# Patient Record
Sex: FEMALE | Race: BLACK OR AFRICAN AMERICAN | NOT HISPANIC OR LATINO | URBAN - METROPOLITAN AREA
[De-identification: names, ages, dates, MRNs, and addresses within clinical notes are randomized per-mention and may not be internally consistent; named-entity substitution may affect disease eponyms.]

---

## 2017-12-10 ENCOUNTER — APPOINTMENT (EMERGENCY)
Dept: RADIOLOGY | Facility: HOSPITAL | Age: 57
DRG: 064 | End: 2017-12-10
Payer: MEDICARE

## 2017-12-10 ENCOUNTER — HOSPITAL ENCOUNTER (INPATIENT)
Facility: HOSPITAL | Age: 57
LOS: 5 days | DRG: 064 | End: 2017-12-15
Attending: EMERGENCY MEDICINE | Admitting: HOSPITALIST
Payer: MEDICARE

## 2017-12-10 DIAGNOSIS — I16.1 HYPERTENSIVE EMERGENCY: ICD-10-CM

## 2017-12-10 DIAGNOSIS — I51.9 CARDIAC DISEASE: Chronic | ICD-10-CM

## 2017-12-10 DIAGNOSIS — E11.9 DIABETES MELLITUS (HCC): Chronic | ICD-10-CM

## 2017-12-10 DIAGNOSIS — N17.9 AKI (ACUTE KIDNEY INJURY) (HCC): ICD-10-CM

## 2017-12-10 DIAGNOSIS — I16.0 HYPERTENSIVE URGENCY: ICD-10-CM

## 2017-12-10 DIAGNOSIS — R56.9 SEIZURES (HCC): Chronic | ICD-10-CM

## 2017-12-10 DIAGNOSIS — R41.82 ALTERED MENTAL STATUS: Primary | ICD-10-CM

## 2017-12-10 DIAGNOSIS — I50.9 CHF (CONGESTIVE HEART FAILURE) (HCC): Chronic | ICD-10-CM

## 2017-12-10 DIAGNOSIS — R73.9 HYPERGLYCEMIA: ICD-10-CM

## 2017-12-10 PROBLEM — I10 HYPERTENSION: Chronic | Status: ACTIVE | Noted: 2017-12-10

## 2017-12-10 PROBLEM — J44.9 COPD (CHRONIC OBSTRUCTIVE PULMONARY DISEASE) (HCC): Chronic | Status: ACTIVE | Noted: 2017-12-10

## 2017-12-10 LAB
ACETONE SERPL-MCNC: NEGATIVE MG/DL
ALBUMIN SERPL BCP-MCNC: 3.6 G/DL (ref 3.5–5)
ALP SERPL-CCNC: 143 U/L (ref 46–116)
ALT SERPL W P-5'-P-CCNC: 25 U/L (ref 12–78)
ANION GAP BLD CALC-SCNC: 18 MMOL/L (ref 4–13)
ANION GAP SERPL CALCULATED.3IONS-SCNC: 6 MMOL/L (ref 4–13)
AST SERPL W P-5'-P-CCNC: 10 U/L (ref 5–45)
BACTERIA UR QL AUTO: NORMAL /HPF
BASOPHILS # BLD AUTO: 0.01 THOUSANDS/ΜL (ref 0–0.1)
BASOPHILS NFR BLD AUTO: 0 % (ref 0–1)
BILIRUB SERPL-MCNC: 0.27 MG/DL (ref 0.2–1)
BILIRUB UR QL STRIP: NEGATIVE
BUN BLD-MCNC: 23 MG/DL (ref 5–25)
BUN SERPL-MCNC: 23 MG/DL (ref 5–25)
CA-I BLD-SCNC: 1.17 MMOL/L (ref 1.12–1.32)
CALCIUM SERPL-MCNC: 9.5 MG/DL (ref 8.3–10.1)
CHLORIDE BLD-SCNC: 96 MMOL/L (ref 100–108)
CHLORIDE SERPL-SCNC: 98 MMOL/L (ref 100–108)
CLARITY UR: CLEAR
CLARITY, POC: CLEAR
CO2 SERPL-SCNC: 32 MMOL/L (ref 21–32)
COLOR UR: YELLOW
COLOR, POC: YELLOW
CREAT BLD-MCNC: 1.3 MG/DL (ref 0.6–1.3)
CREAT SERPL-MCNC: 1.44 MG/DL (ref 0.6–1.3)
EOSINOPHIL # BLD AUTO: 0.15 THOUSAND/ΜL (ref 0–0.61)
EOSINOPHIL NFR BLD AUTO: 2 % (ref 0–6)
ERYTHROCYTE [DISTWIDTH] IN BLOOD BY AUTOMATED COUNT: 13.9 % (ref 11.6–15.1)
EST. AVERAGE GLUCOSE BLD GHB EST-MCNC: 275 MG/DL
EXT BILIRUBIN, UA: ABNORMAL
EXT BLOOD URINE: ABNORMAL
EXT GLUCOSE, UA: >500
EXT KETONES: ABNORMAL
EXT NITRITE, UA: ABNORMAL
EXT PH, UA: 5.5
EXT PROTEIN, UA: ABNORMAL
EXT SPECIFIC GRAVITY, UA: 1.01
EXT UROBILINOGEN: 0.2
GFR SERPL CREATININE-BSD FRML MDRD: 46 ML/MIN/1.73SQ M
GFR SERPL CREATININE-BSD FRML MDRD: 52 ML/MIN/1.73SQ M
GLUCOSE SERPL-MCNC: 368 MG/DL (ref 65–140)
GLUCOSE SERPL-MCNC: 380 MG/DL (ref 65–140)
GLUCOSE SERPL-MCNC: 552 MG/DL (ref 65–140)
GLUCOSE SERPL-MCNC: 553 MG/DL (ref 65–140)
GLUCOSE UR STRIP-MCNC: ABNORMAL MG/DL
HBA1C MFR BLD: 11.2 % (ref 4.2–6.3)
HCT VFR BLD AUTO: 39.1 % (ref 34.8–46.1)
HCT VFR BLD CALC: 39 % (ref 34.8–46.1)
HGB BLD-MCNC: 12.2 G/DL (ref 11.5–15.4)
HGB BLDA-MCNC: 13.3 G/DL (ref 11.5–15.4)
HGB UR QL STRIP.AUTO: ABNORMAL
HYALINE CASTS #/AREA URNS LPF: NORMAL /LPF
KETONES UR STRIP-MCNC: NEGATIVE MG/DL
LEUKOCYTE ESTERASE UR QL STRIP: NEGATIVE
LYMPHOCYTES # BLD AUTO: 2.8 THOUSANDS/ΜL (ref 0.6–4.47)
LYMPHOCYTES NFR BLD AUTO: 33 % (ref 14–44)
MAGNESIUM SERPL-MCNC: 2.1 MG/DL (ref 1.6–2.6)
MCH RBC QN AUTO: 27.2 PG (ref 26.8–34.3)
MCHC RBC AUTO-ENTMCNC: 31.2 G/DL (ref 31.4–37.4)
MCV RBC AUTO: 87 FL (ref 82–98)
MONOCYTES # BLD AUTO: 0.4 THOUSAND/ΜL (ref 0.17–1.22)
MONOCYTES NFR BLD AUTO: 5 % (ref 4–12)
NEUTROPHILS # BLD AUTO: 5.2 THOUSANDS/ΜL (ref 1.85–7.62)
NEUTS SEG NFR BLD AUTO: 60 % (ref 43–75)
NITRITE UR QL STRIP: NEGATIVE
NON-SQ EPI CELLS URNS QL MICRO: NORMAL /HPF
NRBC BLD AUTO-RTO: 0 /100 WBCS
NT-PROBNP SERPL-MCNC: 787 PG/ML
PCO2 BLD: 29 MMOL/L (ref 21–32)
PH UR STRIP.AUTO: 5.5 [PH] (ref 4.5–8)
PHOSPHATE SERPL-MCNC: 3.9 MG/DL (ref 2.7–4.5)
PLATELET # BLD AUTO: 251 THOUSANDS/UL (ref 149–390)
PLATELET # BLD AUTO: 253 THOUSANDS/UL (ref 149–390)
PMV BLD AUTO: 11.6 FL (ref 8.9–12.7)
PMV BLD AUTO: 11.9 FL (ref 8.9–12.7)
POTASSIUM BLD-SCNC: 4.5 MMOL/L (ref 3.5–5.3)
POTASSIUM SERPL-SCNC: 4.4 MMOL/L (ref 3.5–5.3)
PROT SERPL-MCNC: 8.1 G/DL (ref 6.4–8.2)
PROT UR STRIP-MCNC: ABNORMAL MG/DL
RBC # BLD AUTO: 4.48 MILLION/UL (ref 3.81–5.12)
RBC #/AREA URNS AUTO: NORMAL /HPF
SODIUM BLD-SCNC: 137 MMOL/L (ref 136–145)
SODIUM SERPL-SCNC: 136 MMOL/L (ref 136–145)
SP GR UR STRIP.AUTO: 1.01 (ref 1–1.03)
SPECIMEN SOURCE: ABNORMAL
SPECIMEN SOURCE: NORMAL
TROPONIN I BLD-MCNC: 0.05 NG/ML (ref 0–0.08)
TROPONIN I SERPL-MCNC: 0.49 NG/ML
TSH SERPL DL<=0.05 MIU/L-ACNC: 0.53 UIU/ML (ref 0.36–3.74)
UROBILINOGEN UR QL STRIP.AUTO: 0.2 E.U./DL
WBC # BLD AUTO: 8.59 THOUSAND/UL (ref 4.31–10.16)
WBC # BLD EST: ABNORMAL 10*3/UL
WBC #/AREA URNS AUTO: NORMAL /HPF

## 2017-12-10 PROCEDURE — 80047 BASIC METABLC PNL IONIZED CA: CPT

## 2017-12-10 PROCEDURE — 96361 HYDRATE IV INFUSION ADD-ON: CPT

## 2017-12-10 PROCEDURE — 80053 COMPREHEN METABOLIC PANEL: CPT | Performed by: EMERGENCY MEDICINE

## 2017-12-10 PROCEDURE — 81002 URINALYSIS NONAUTO W/O SCOPE: CPT | Performed by: EMERGENCY MEDICINE

## 2017-12-10 PROCEDURE — 83880 ASSAY OF NATRIURETIC PEPTIDE: CPT | Performed by: EMERGENCY MEDICINE

## 2017-12-10 PROCEDURE — 96376 TX/PRO/DX INJ SAME DRUG ADON: CPT

## 2017-12-10 PROCEDURE — 70450 CT HEAD/BRAIN W/O DYE: CPT

## 2017-12-10 PROCEDURE — 84443 ASSAY THYROID STIM HORMONE: CPT | Performed by: INTERNAL MEDICINE

## 2017-12-10 PROCEDURE — 80186 ASSAY OF PHENYTOIN FREE: CPT | Performed by: INTERNAL MEDICINE

## 2017-12-10 PROCEDURE — 71020 HB CHEST X-RAY 2VW FRONTAL&LATL: CPT

## 2017-12-10 PROCEDURE — 96374 THER/PROPH/DIAG INJ IV PUSH: CPT

## 2017-12-10 PROCEDURE — 93005 ELECTROCARDIOGRAM TRACING: CPT | Performed by: EMERGENCY MEDICINE

## 2017-12-10 PROCEDURE — 96375 TX/PRO/DX INJ NEW DRUG ADDON: CPT

## 2017-12-10 PROCEDURE — 81001 URINALYSIS AUTO W/SCOPE: CPT

## 2017-12-10 PROCEDURE — 85049 AUTOMATED PLATELET COUNT: CPT | Performed by: INTERNAL MEDICINE

## 2017-12-10 PROCEDURE — 85014 HEMATOCRIT: CPT

## 2017-12-10 PROCEDURE — 83735 ASSAY OF MAGNESIUM: CPT | Performed by: EMERGENCY MEDICINE

## 2017-12-10 PROCEDURE — 84100 ASSAY OF PHOSPHORUS: CPT | Performed by: EMERGENCY MEDICINE

## 2017-12-10 PROCEDURE — 84484 ASSAY OF TROPONIN QUANT: CPT

## 2017-12-10 PROCEDURE — 83036 HEMOGLOBIN GLYCOSYLATED A1C: CPT | Performed by: INTERNAL MEDICINE

## 2017-12-10 PROCEDURE — 85025 COMPLETE CBC W/AUTO DIFF WBC: CPT | Performed by: EMERGENCY MEDICINE

## 2017-12-10 PROCEDURE — 82009 KETONE BODYS QUAL: CPT | Performed by: EMERGENCY MEDICINE

## 2017-12-10 PROCEDURE — 82948 REAGENT STRIP/BLOOD GLUCOSE: CPT

## 2017-12-10 PROCEDURE — 99285 EMERGENCY DEPT VISIT HI MDM: CPT

## 2017-12-10 PROCEDURE — 36415 COLL VENOUS BLD VENIPUNCTURE: CPT | Performed by: EMERGENCY MEDICINE

## 2017-12-10 PROCEDURE — 84484 ASSAY OF TROPONIN QUANT: CPT | Performed by: INTERNAL MEDICINE

## 2017-12-10 RX ORDER — ACETAMINOPHEN 325 MG/1
650 TABLET ORAL EVERY 6 HOURS PRN
Status: DISCONTINUED | OUTPATIENT
Start: 2017-12-10 | End: 2017-12-15 | Stop reason: HOSPADM

## 2017-12-10 RX ORDER — LEVETIRACETAM 500 MG/1
1000 TABLET ORAL EVERY 12 HOURS SCHEDULED
Status: DISCONTINUED | OUTPATIENT
Start: 2017-12-10 | End: 2017-12-15 | Stop reason: HOSPADM

## 2017-12-10 RX ORDER — SENNOSIDES 8.6 MG
1 TABLET ORAL DAILY
Status: DISCONTINUED | OUTPATIENT
Start: 2017-12-11 | End: 2017-12-15 | Stop reason: HOSPADM

## 2017-12-10 RX ORDER — CLONIDINE HYDROCHLORIDE 0.2 MG/1
0.2 TABLET ORAL 2 TIMES DAILY
Status: DISCONTINUED | OUTPATIENT
Start: 2017-12-10 | End: 2017-12-15 | Stop reason: HOSPADM

## 2017-12-10 RX ORDER — SODIUM CHLORIDE 9 MG/ML
125 INJECTION, SOLUTION INTRAVENOUS CONTINUOUS
Status: DISCONTINUED | OUTPATIENT
Start: 2017-12-10 | End: 2017-12-10

## 2017-12-10 RX ORDER — METOPROLOL SUCCINATE 50 MG/1
50 TABLET, EXTENDED RELEASE ORAL DAILY
Status: DISCONTINUED | OUTPATIENT
Start: 2017-12-11 | End: 2017-12-13

## 2017-12-10 RX ORDER — ONDANSETRON 2 MG/ML
4 INJECTION INTRAMUSCULAR; INTRAVENOUS EVERY 6 HOURS PRN
Status: DISCONTINUED | OUTPATIENT
Start: 2017-12-10 | End: 2017-12-15 | Stop reason: HOSPADM

## 2017-12-10 RX ORDER — MAGNESIUM HYDROXIDE/ALUMINUM HYDROXICE/SIMETHICONE 120; 1200; 1200 MG/30ML; MG/30ML; MG/30ML
30 SUSPENSION ORAL EVERY 6 HOURS PRN
Status: DISCONTINUED | OUTPATIENT
Start: 2017-12-10 | End: 2017-12-15 | Stop reason: HOSPADM

## 2017-12-10 RX ORDER — CLONIDINE HYDROCHLORIDE 0.2 MG/1
0.2 TABLET ORAL 2 TIMES DAILY
COMMUNITY

## 2017-12-10 RX ORDER — ONDANSETRON 2 MG/ML
INJECTION INTRAMUSCULAR; INTRAVENOUS
Status: COMPLETED
Start: 2017-12-10 | End: 2017-12-10

## 2017-12-10 RX ORDER — INSULIN GLARGINE 100 [IU]/ML
50 INJECTION, SOLUTION SUBCUTANEOUS 2 TIMES DAILY
COMMUNITY

## 2017-12-10 RX ORDER — PHENYTOIN SODIUM 100 MG/1
100 CAPSULE, EXTENDED RELEASE ORAL 3 TIMES DAILY
Status: DISCONTINUED | OUTPATIENT
Start: 2017-12-10 | End: 2017-12-15 | Stop reason: HOSPADM

## 2017-12-10 RX ORDER — CALCIUM CARBONATE 200(500)MG
1000 TABLET,CHEWABLE ORAL 3 TIMES DAILY PRN
Status: DISCONTINUED | OUTPATIENT
Start: 2017-12-10 | End: 2017-12-15 | Stop reason: HOSPADM

## 2017-12-10 RX ORDER — LEVOCETIRIZINE DIHYDROCHLORIDE 5 MG/1
5 TABLET, FILM COATED ORAL EVERY EVENING
COMMUNITY

## 2017-12-10 RX ORDER — HYDRALAZINE HYDROCHLORIDE 20 MG/ML
10 INJECTION INTRAMUSCULAR; INTRAVENOUS ONCE
Status: COMPLETED | OUTPATIENT
Start: 2017-12-10 | End: 2017-12-10

## 2017-12-10 RX ORDER — LABETALOL HYDROCHLORIDE 5 MG/ML
20 INJECTION, SOLUTION INTRAVENOUS ONCE
Status: COMPLETED | OUTPATIENT
Start: 2017-12-10 | End: 2017-12-10

## 2017-12-10 RX ORDER — CLOPIDOGREL BISULFATE 75 MG/1
75 TABLET ORAL DAILY
COMMUNITY

## 2017-12-10 RX ORDER — ASPIRIN 81 MG/1
81 TABLET ORAL 2 TIMES DAILY
COMMUNITY

## 2017-12-10 RX ORDER — DOCUSATE SODIUM 100 MG/1
100 CAPSULE, LIQUID FILLED ORAL 2 TIMES DAILY
Status: DISCONTINUED | OUTPATIENT
Start: 2017-12-10 | End: 2017-12-15 | Stop reason: HOSPADM

## 2017-12-10 RX ORDER — ACETAMINOPHEN 325 MG/1
975 TABLET ORAL EVERY 6 HOURS PRN
Status: DISCONTINUED | OUTPATIENT
Start: 2017-12-10 | End: 2017-12-15 | Stop reason: HOSPADM

## 2017-12-10 RX ORDER — INSULIN GLARGINE 100 [IU]/ML
10 INJECTION, SOLUTION SUBCUTANEOUS ONCE
Status: DISCONTINUED | OUTPATIENT
Start: 2017-12-10 | End: 2017-12-10

## 2017-12-10 RX ORDER — PANTOPRAZOLE SODIUM 40 MG/1
40 INJECTION, POWDER, FOR SOLUTION INTRAVENOUS
Status: DISCONTINUED | OUTPATIENT
Start: 2017-12-11 | End: 2017-12-14

## 2017-12-10 RX ORDER — FUROSEMIDE 10 MG/ML
40 INJECTION INTRAMUSCULAR; INTRAVENOUS ONCE
Status: COMPLETED | OUTPATIENT
Start: 2017-12-10 | End: 2017-12-10

## 2017-12-10 RX ORDER — LISINOPRIL 2.5 MG/1
2.5 TABLET ORAL DAILY
COMMUNITY
End: 2017-12-15 | Stop reason: HOSPADM

## 2017-12-10 RX ORDER — CLOPIDOGREL BISULFATE 75 MG/1
75 TABLET ORAL DAILY
Status: DISCONTINUED | OUTPATIENT
Start: 2017-12-11 | End: 2017-12-15 | Stop reason: HOSPADM

## 2017-12-10 RX ORDER — PHENYTOIN SODIUM 100 MG/1
100 CAPSULE, EXTENDED RELEASE ORAL
Status: DISCONTINUED | OUTPATIENT
Start: 2017-12-10 | End: 2017-12-15 | Stop reason: HOSPADM

## 2017-12-10 RX ORDER — METOPROLOL SUCCINATE 50 MG/1
50 TABLET, EXTENDED RELEASE ORAL DAILY
COMMUNITY
End: 2017-12-15 | Stop reason: HOSPADM

## 2017-12-10 RX ORDER — ONDANSETRON 2 MG/ML
4 INJECTION INTRAMUSCULAR; INTRAVENOUS ONCE
Status: COMPLETED | OUTPATIENT
Start: 2017-12-10 | End: 2017-12-10

## 2017-12-10 RX ORDER — HYDRALAZINE HYDROCHLORIDE 20 MG/ML
10 INJECTION INTRAMUSCULAR; INTRAVENOUS EVERY 6 HOURS SCHEDULED
Status: DISCONTINUED | OUTPATIENT
Start: 2017-12-11 | End: 2017-12-11

## 2017-12-10 RX ORDER — ATORVASTATIN CALCIUM 40 MG/1
40 TABLET, FILM COATED ORAL DAILY
COMMUNITY

## 2017-12-10 RX ORDER — PHENYTOIN SODIUM 100 MG/1
100 CAPSULE, EXTENDED RELEASE ORAL 3 TIMES DAILY
COMMUNITY

## 2017-12-10 RX ADMIN — ONDANSETRON 4 MG: 2 INJECTION INTRAMUSCULAR; INTRAVENOUS at 18:05

## 2017-12-10 RX ADMIN — Medication 1000 MG: at 22:02

## 2017-12-10 RX ADMIN — SODIUM CHLORIDE 1000 ML: 0.9 INJECTION, SOLUTION INTRAVENOUS at 17:00

## 2017-12-10 RX ADMIN — LABETALOL 20 MG/4 ML (5 MG/ML) INTRAVENOUS SYRINGE 20 MG: at 19:32

## 2017-12-10 RX ADMIN — PHENYTOIN SODIUM 100 MG: 100 CAPSULE ORAL at 22:28

## 2017-12-10 RX ADMIN — Medication 8 UNITS/HR: at 22:24

## 2017-12-10 RX ADMIN — LEVETIRACETAM 1000 MG: 500 TABLET ORAL at 21:47

## 2017-12-10 RX ADMIN — CLONIDINE HYDROCHLORIDE 0.2 MG: 0.2 TABLET ORAL at 22:02

## 2017-12-10 RX ADMIN — FUROSEMIDE 40 MG: 10 INJECTION, SOLUTION INTRAMUSCULAR; INTRAVENOUS at 23:13

## 2017-12-10 RX ADMIN — DOCUSATE SODIUM 100 MG: 100 CAPSULE, LIQUID FILLED ORAL at 22:27

## 2017-12-10 RX ADMIN — SODIUM CHLORIDE 125 ML/HR: 0.9 INJECTION, SOLUTION INTRAVENOUS at 21:21

## 2017-12-10 RX ADMIN — HYDRALAZINE HYDROCHLORIDE 10 MG: 20 INJECTION INTRAMUSCULAR; INTRAVENOUS at 23:13

## 2017-12-10 RX ADMIN — SODIUM CHLORIDE 1000 ML: 0.9 INJECTION, SOLUTION INTRAVENOUS at 19:25

## 2017-12-10 RX ADMIN — NITROGLYCERIN 1 INCH: 20 OINTMENT TOPICAL at 21:47

## 2017-12-10 RX ADMIN — LABETALOL 20 MG/4 ML (5 MG/ML) INTRAVENOUS SYRINGE 20 MG: at 18:07

## 2017-12-10 RX ADMIN — ACETAMINOPHEN 975 MG: 325 TABLET, FILM COATED ORAL at 17:40

## 2017-12-10 RX ADMIN — HYDRALAZINE HYDROCHLORIDE 10 MG: 20 INJECTION INTRAMUSCULAR; INTRAVENOUS at 20:52

## 2017-12-10 NOTE — ED PROVIDER NOTES
History  Chief Complaint   Patient presents with    Altered Mental Status     Patient found staring off at slot machine at casino at 1600  Patient found with BS of over 600 by EMS  Patient here from [place] on a bus trip  Patient also has no had her medications since last week due to "grand-daughter did not pick them up "     Patient is a 61yo F with a pmhx of COPD, CHF, HTN, T2DM, who presents via EMS for AMS  Patient is from [place] and was at the [facility]  when she began to "stare off" at the slot machine  EMS says that patient was minimally responsive when they picked her up  Her blood glucose was > 600  Patient was HTN with BP int he 371'I systolic  Patient has not been taking her insulin or her blood pressure medications for the past week because "her grand daughter did not pick them up "  Patient is complaining of a generalized headache  She denies lightheadedness, dizziness, chest pain, sob, abdominal pain, nausea, vomiting, diarrhea, constipation or leg swelling  On exam, patient is AAO x3  She has no focal neurological deficits  Her lungs are CTA b/l  Abdomen is soft and non-tender  Patient had an episode of vomiting while at X ray  Patient given Zofran with improvement  Assessment and Plan:  Patient is a 61yo F with a pmhx of COPD, HTN, CHF, DM2 who presents for AMS, hyperglycemia and HTN  Will get a CT of head to evaluate for hemorrhage given elevated BP, will get CBC, CMP, Acetone, Mg and Phos to evaluate for possible DKA  Will give patient IV fluids  Cardiac workup  Prior to Admission Medications   Prescriptions Last Dose Informant Patient Reported? Taking?    LEVETIRACETAM PO  Family Member Yes Yes   Sig: Take 100 mg by mouth 2 (two) times a day   aspirin (ECOTRIN LOW STRENGTH) 81 mg EC tablet  Self Yes Yes   Sig: Take 81 mg by mouth 2 (two) times a day     atorvastatin (LIPITOR) 40 mg tablet  Family Member Yes Yes   Sig: Take 40 mg by mouth daily   cloNIDine (CATAPRES) 0 2 mg tablet  Family Member Yes Yes   Sig: Take 0 2 mg by mouth 2 (two) times a day   clopidogrel (PLAVIX) 75 mg tablet  Family Member Yes Yes   Sig: Take 75 mg by mouth daily   insulin aspart (NovoLOG) 100 units/mL injection  Family Member Yes Yes   Sig: Inject 16 Units under the skin 2 (two) times a day before meals   insulin glargine (LANTUS) 100 units/mL subcutaneous injection  Family Member Yes Yes   Sig: Inject 50 Units under the skin 2 (two) times a day   levocetirizine (XYZAL) 5 MG tablet  Family Member Yes Yes   Sig: Take 5 mg by mouth every evening   lisinopril (ZESTRIL) 2 5 mg tablet  Family Member Yes Yes   Sig: Take 2 5 mg by mouth daily   metFORMIN (GLUCOPHAGE) 1000 MG tablet  Family Member Yes Yes   Sig: Take 1,000 mg by mouth 2 (two) times a day with meals   metoprolol succinate (TOPROL-XL) 50 mg 24 hr tablet  Family Member Yes Yes   Sig: Take 50 mg by mouth daily   phenytoin (DILANTIN) 100 mg ER capsule  Family Member Yes Yes   Sig: Take 100 mg by mouth 3 (three) times a day 100 mg at 0900 and 2100    Donan Mort 200 mg at 1500      sitaGLIPtin (JANUVIA) 100 mg tablet  Family Member Yes Yes   Sig: Take 100 mg by mouth daily      Facility-Administered Medications: None       Past Medical History:   Diagnosis Date    Cardiac disease     CHF (congestive heart failure) ([facility] )     COPD (chronic obstructive pulmonary disease) ([facility] )     Diabetes mellitus ([facility] )     Hypertension     Seizures ([place] [address] )        Past Surgical History:   Procedure Laterality Date    CARDIAC CATHETERIZATION  12/2017    R groin access, no intervention    CHOLECYSTECTOMY         History reviewed  No pertinent family history  I have reviewed and agree with the history as documented  Social History   Substance Use Topics    Smoking status: Never Smoker    Smokeless tobacco: Never Used    Alcohol use No        Review of Systems   Constitutional: Negative for chills and fever  HENT: Negative for congestion, sneezing and trouble swallowing  Eyes: Negative for pain, discharge and itching  Respiratory: Negative for cough, chest tightness, shortness of breath and wheezing  Cardiovascular: Negative for chest pain and palpitations  Gastrointestinal: Negative for abdominal pain, diarrhea, nausea and vomiting  Endocrine: Positive for polydipsia and polyuria  Genitourinary: Negative for dysuria, frequency and hematuria  Musculoskeletal: Negative for arthralgias and back pain  Skin: Negative for color change and rash  Neurological: Positive for headaches  Negative for dizziness, weakness, light-headedness and numbness  All other systems reviewed and are negative  Physical Exam  ED Triage Vitals   Temperature Pulse Respirations Blood Pressure SpO2   12/10/17 1726 12/10/17 1726 12/10/17 1726 12/10/17 1726 12/10/17 1726   (!) 97 °F (36 1 °C) 103 21 (!) 225/115 99 %      Temp Source Heart Rate Source Patient Position - Orthostatic VS BP Location FiO2 (%)   12/10/17 1726 12/10/17 1726 12/10/17 1726 12/10/17 1726 --   Tympanic Monitor Sitting Right arm       Pain Score       12/10/17 1831       4           Orthostatic Vital Signs  Vitals:    12/10/17 2127 12/10/17 2313 12/10/17 2321 12/10/17 2341   BP: (!) 205/95 (!) 191/86 (!) 171/77 132/62   Pulse:   100    Patient Position - Orthostatic VS:           Physical Exam   Constitutional: She is oriented to person, place, and time  She appears well-developed and well-nourished  No distress  HENT:   Head: Normocephalic and atraumatic  Eyes: Conjunctivae and EOM are normal  Pupils are equal, round, and reactive to light  Right eye exhibits no discharge  Left eye exhibits no discharge  Neck: Neck supple  Cardiovascular: Normal rate, regular rhythm and normal heart sounds  Exam reveals no friction rub  No murmur heard  Pulmonary/Chest: Effort normal and breath sounds normal  No respiratory distress  She has no wheezes  She has no rales  Abdominal: Soft  She exhibits no distension  There is no tenderness  There is no guarding  Musculoskeletal: She exhibits no edema or deformity  Lymphadenopathy:     She has no cervical adenopathy  Neurological: She is alert and oriented to person, place, and time  No cranial nerve deficit  Skin: Skin is warm and dry         ED Medications  Medications   acetaminophen (TYLENOL) tablet 975 mg (975 mg Oral Given 12/10/17 1740)   hydrALAZINE (APRESOLINE) injection 10 mg (10 mg Intravenous Given 12/10/17 2313)   docusate sodium (COLACE) capsule 100 mg (100 mg Oral Given 12/10/17 2227)   senna (SENOKOT) tablet 8 6 mg (not administered)   ondansetron (ZOFRAN) injection 4 mg (not administered)   aluminum-magnesium hydroxide-simethicone (MYLANTA) 200-200-20 mg/5 mL oral suspension 30 mL (not administered)   enoxaparin (LOVENOX) subcutaneous injection 40 mg (not administered)   acetaminophen (TYLENOL) tablet 650 mg (not administered)   insulin lispro (HumaLOG) 100 units/mL subcutaneous injection 10 Units (not administered)   clopidogrel (PLAVIX) tablet 75 mg (not administered)   metoprolol succinate (TOPROL-XL) 24 hr tablet 50 mg (not administered)   phenytoin (DILANTIN) ER capsule 100 mg (100 mg Oral Not Given 12/10/17 2228)   phenytoin (DILANTIN) ER capsule 100 mg (100 mg Oral Given 12/10/17 2228)   insulin regular (HumuLIN R,NovoLIN R) 1 Units/mL in sodium chloride 0 9 % 100 mL infusion (10 Units/hr Intravenous Rate/Dose Change 12/11/17 0045)   levETIRAcetam (KEPPRA) tablet 1,000 mg (1,000 mg Oral Given 12/10/17 2147)   cloNIDine (CATAPRES) tablet 0 2 mg (0 2 mg Oral Given 12/10/17 2202)   pantoprazole (PROTONIX) injection 40 mg (not administered)   calcium carbonate (TUMS) chewable tablet 1,000 mg (1,000 mg Oral Given 12/10/17 2202)   sodium chloride 0 9 % bolus 1,000 mL (0 mL Intravenous Stopped 12/10/17 1800)   labetalol (NORMODYNE) injection 20 mg (20 mg Intravenous Given 12/10/17 1807)   ondansetron (ZOFRAN) injection 4 mg (4 mg Intravenous Given 12/10/17 1805)   sodium chloride 0 9 % bolus 1,000 mL (1,000 mL Intravenous New Bag 12/10/17 1925)   labetalol (NORMODYNE) injection 20 mg (20 mg Intravenous Given 12/10/17 1932)   nitroglycerin (NITRO-BID) 2 % TD ointment 1 inch (1 inch Topical Given 12/10/17 2147)   hydrALAZINE (APRESOLINE) injection 10 mg (10 mg Intravenous Given 12/10/17 2052)   furosemide (LASIX) injection 40 mg (40 mg Intravenous Given 12/10/17 2313)       Diagnostic Studies  Results Reviewed     Procedure Component Value Units Date/Time    Troponin I [[id number]]  (Abnormal) Collected:  12/10/17 2128    Lab Status:  Final result Specimen:  Blood from Arm, Left Updated:  12/10/17 2157     Troponin I 0 49 (H) ng/mL     Narrative:         [employer] Chemistry analyzer 99% cutoff is > 0 04 ng/mL in network labs    o cTnI 99% cutoff is useful only when applied to patients in the clinical setting of myocardial ischemia  o cTnI 99% cutoff should be interpreted in the context of clinical history, ECG findings and possibly cardiac imaging to establish correct diagnosis  o cTnI 99% cutoff may be suggestive but clearly not indicative of a coronary event without the clinical setting of myocardial ischemia  Phenytoin level, free [[id number]] Collected:  12/10/17 2128    Lab Status:   In process Specimen:  Blood from Arm, Left Updated:  12/10/17 2136    Urine Microscopic [[id number]]  (Normal) Collected:  12/10/17 1755    Lab Status:  Final result Specimen:  Urine Updated:  12/10/17 1826     RBC, UA None Seen /hpf      WBC, UA None Seen /hpf      Epithelial Cells None Seen /hpf      Bacteria, UA Occasional /hpf      Hyaline Casts, UA None Seen /lpf     Comprehensive metabolic panel [[id number]]  (Abnormal) Collected:  12/10/17 1730    Lab Status:  Final result Specimen:  Blood from Arm, Left Updated:  12/10/17 1825     Sodium 136 mmol/L      Potassium 4 4 mmol/L      Chloride 98 (L) mmol/L      CO2 32 mmol/L      Anion Gap 6 mmol/L      BUN 23 mg/dL      Creatinine 1 44 (H) mg/dL      Glucose 552 (HH) mg/dL      Calcium 9 5 mg/dL      AST 10 U/L      ALT 25 U/L      Alkaline Phosphatase 143 (H) U/L      Total Protein 8 1 g/dL      Albumin 3 6 g/dL      Total Bilirubin 0 27 mg/dL      eGFR 46 ml/min/1 73sq m     Narrative:         National Kidney Disease Education Program recommendations are as follows:  GFR calculation is accurate only with a steady state creatinine  Chronic Kidney disease less than 60 ml/min/1 73 sq  meters  Kidney failure less than 15 ml/min/1 73 sq  meters      BNP [[id number]]  (Abnormal) Collected:  12/10/17 1730    Lab Status:  Final result Specimen:  Blood from Arm, Left Updated:  12/10/17 1801     NT-proBNP 787 (H) pg/mL     POCT Chem 8+ [[id number]]  (Abnormal) Collected:  12/10/17 1754    Lab Status:  Final result Updated:  12/10/17 1800     SODIUM, I-STAT 137 mmol/l      Potassium, i-STAT 4 5 mmol/L      Chloride, istat 96 (L) mmol/L      CO2, i-STAT 29 mmol/L      Anion Gap, Istat 18 (H) mmol/L      Calcium, Ionized i-STAT 1 17 mmol/L      BUN, I-STAT 23 mg/dl      Creatinine, i-STAT 1 3 mg/dl      eGFR 52 ml/min/1 73sq m      Glucose, i-STAT 553 (HH) mg/dl      Hct, i-STAT 39 %      Hgb, i-STAT 13 3 g/dl      Specimen Type VENOUS    Magnesium [[id number]]  (Normal) Collected:  12/10/17 1730    Lab Status:  Final result Specimen:  Blood from Arm, Left Updated:  12/10/17 1758     Magnesium 2 1 mg/dL     Phosphorus [[id number]]  (Normal) Collected:  12/10/17 1730    Lab Status:  Final result Specimen:  Blood from Arm, Left Updated:  12/10/17 1758     Phosphorus 3 9 mg/dL     POCT urinalysis dipstick [[id number]]  (Abnormal) Resulted:  12/10/17 1756    Lab Status:  Final result Specimen:  Urine Updated:  12/10/17 1757     Color, UA Yellow     Clarity, UA Clear     EXT Glucose, UA >500     EXT Bilirubin, UA (Ref: Negative) Neg     EXT Ketones, UA (Ref: Negative) Neg     EXT Spec Grav, UA 1 010     EXT Blood, UA (Ref: Negative) Trace-intact     EXT pH, UA 5 5     EXT Protein, UA (Ref: Negative) Trace     EXT Urobilinogen, UA (Ref: 0 2- 1 0) 0 2     EXT Leukocytes, UA (Ref: Negative) Neg     EXT Nitrite, UA (Ref: Negative) Neg    ED Urine Macroscopic [[id number]]  (Abnormal) Collected:  12/10/17 1755    Lab Status:  Final result Specimen:  Urine Updated:  12/10/17 1755     Color, UA Yellow     Clarity, UA Clear     pH, UA 5 5     Leukocytes, UA Negative     Nitrite, UA Negative     Protein, UA Trace (A) mg/dl      Glucose,  (1/2%) (A) mg/dl      Ketones, UA Negative mg/dl      Urobilinogen, UA 0 2 E U /dl      Bilirubin, UA Negative     Blood, UA Trace (A)     Specific Bowman, UA 1 010    Narrative:       CLINITEK RESULT    Acetone [[id number]]  (Normal) Collected:  12/10/17 1730    Lab Status:  Final result Specimen:  Blood from Arm, Left Updated:  12/10/17 1754     Acetone, Bld Negative    POCT troponin [[id number]]  (Normal) Collected:  12/10/17 1736    Lab Status:  Final result Updated:  12/10/17 1748     POC Troponin I 0 05 ng/ml      Specimen Type VENOUS    Narrative:         [employer] i-Stat handheld analyzer 99% cutoff is > 0 08ng/mL in [facility] Emergency Departments    o cTnI 99% cutoff is useful only when applied to patients in the clinical setting of myocardial ischemia  o cTnI 99% cutoff should be interpreted in the context of clinical history, ECG findings and possibly cardiac imaging to establish correct diagnosis  o cTnI 99% cutoff may be suggestive but clearly not indicative of a coronary event without the clinical setting of myocardial ischemia      CBC and differential [[id number]]  (Abnormal) Collected:  12/10/17 1730    Lab Status:  Final result Specimen:  Blood from Arm, Left Updated:  12/10/17 1746     WBC 8 59 Thousand/uL      RBC 4 48 Million/uL      Hemoglobin 12 2 g/dL      Hematocrit 39 1 %      MCV 87 fL      MCH 27 2 pg      MCHC 31 2 (L) g/dL      RDW 13 9 %      MPV 11 6 fL      Platelets 699 Thousands/uL      nRBC 0 /100 WBCs      Neutrophils Relative 60 %      Lymphocytes Relative 33 %      Monocytes Relative 5 %      Eosinophils Relative 2 %      Basophils Relative 0 %      Neutrophils Absolute 5 20 Thousands/µL      Lymphocytes Absolute 2 80 Thousands/µL      Monocytes Absolute 0 40 Thousand/µL      Eosinophils Absolute 0 15 Thousand/µL      Basophils Absolute 0 01 Thousands/µL                  CT head without contrast   Final Result by Theresa Santiago MD (12/10 1821)      No acute intracranial abnormality  Workstation performed: [id number]         XR chest 2 views    (Results Pending)         Procedures  ECG 12 Lead Documentation  Date/Time: 12/10/2017 7:26 PM  Performed by: Linda Martin  Authorized by: Patel Workman             Phone Consults  ED Phone Contact    ED Course  ED Course                                MDM  Number of Diagnoses or Management Options  ROSELIA (acute kidney injury) [facility]): Hyperglycemia:   Hypertensive urgency:   Diagnosis management comments: Patient is a 63yo F with pmhx of COPD, CHF, HTN, DM who presented for AMS, HTN and hyperglycemia  1  AMS  - CT head  - AAO x 3    2  HTN  - 40mg of labetalol given  - reduce BP by 25%    3   Hyperglycemia  - CBC, CMP, UA, Acetone, Mg, Phos to check for DKA  - IVF  - 10 units of insulin given     CritCare Time    Disposition  Final diagnoses:   Hypertensive urgency   Hyperglycemia   ROSELIA (acute kidney injury) ([facility] )     Time reflects when diagnosis was documented in both MDM as applicable and the Disposition within this note     Time User Action Codes Description Comment    12/10/2017  7:48 PM Claressa Shelling Add [R41 82] Altered mental status     12/10/2017  7:48 PM Claressa Shelling Modify [R41 82] Altered mental status     12/10/2017  7:48 PM Claressa Shelling Modify [R41 82] Altered mental status     12/10/2017  7:48 PM Doneen Ream R Add [I51 9] Cardiac disease     12/10/2017  7:48 PM Claressa Shelling Modify [I51 9] Cardiac disease     12/10/2017  7:48 PM Claressa Shelling Modify [I51 9] Cardiac disease     12/10/2017  7:49 PM Marrianne Migdalia R Add [I50 9] CHF (congestive heart failure) ([place] [address] )     12/10/2017  7:49 PM Marrianne Migdalia R Modify [I50 9] CHF (congestive heart failure) ([place] [address] )     12/10/2017  7:49 PM Marrianne Migdalia R Modify [I50 9] CHF (congestive heart failure) ([place] [address] )     12/10/2017  7:51 PM [place], Chyrl Bidding R Add [E11 9] Diabetes mellitus ([place] [address] )     12/10/2017  7:51 PM Marrianne Migdalia R Modify [E11 9] Diabetes mellitus ([place] [address] )     12/10/2017  7:51 PM Marrianne Migdalia R Modify [E11 9] Diabetes mellitus ([place] [address] )     12/10/2017  8:00 PM Mapleton Lash [I16 0] Hypertensive urgency     12/10/2017  8:00 PM Rowdy Jump Add [R73 9] Hyperglycemia     12/10/2017  8:00 PM Rowdy Jump Add [N17 9] ROSELIA (acute kidney injury) ([place] [address] )     12/10/2017 10:05 PM Marrianne Migdalia R Add [R56 9] Seizures ([place] [address] )     12/10/2017 10:05 PM Marrianne Migdalia R Modify [R56 9] Seizures ([place] [address] )     12/10/2017 10:54 PM Marrianne Migdalia R Add [I16 1] Hypertensive emergency       ED Disposition     ED Disposition Condition Comment    Admit  Case was discussed with SLIM nd the patient's admission status was agreed to be Admission Status: inpatient status to the service of Dr Kristen Garza           Follow-up Information    None       Current Discharge Medication List      CONTINUE these medications which have NOT CHANGED    Details   aspirin (ECOTRIN LOW STRENGTH) 81 mg EC tablet Take 81 mg by mouth 2 (two) times a day        atorvastatin (LIPITOR) 40 mg tablet Take 40 mg by mouth daily      cloNIDine (CATAPRES) 0 2 mg tablet Take 0 2 mg by mouth 2 (two) times a day      clopidogrel (PLAVIX) 75 mg tablet Take 75 mg by mouth daily      insulin aspart (NovoLOG) 100 units/mL injection Inject 16 Units under the skin 2 (two) times a day before meals      insulin glargine (LANTUS) 100 units/mL subcutaneous injection Inject 50 Units under the skin 2 (two) times a day      LEVETIRACETAM PO Take 100 mg by mouth 2 (two) times a day levocetirizine (XYZAL) 5 MG tablet Take 5 mg by mouth every evening      lisinopril (ZESTRIL) 2 5 mg tablet Take 2 5 mg by mouth daily      metFORMIN (GLUCOPHAGE) 1000 MG tablet Take 1,000 mg by mouth 2 (two) times a day with meals      metoprolol succinate (TOPROL-XL) 50 mg 24 hr tablet Take 50 mg by mouth daily      phenytoin (DILANTIN) 100 mg ER capsule Take 100 mg by mouth 3 (three) times a day 100 mg at 0900 and 2100    Kinsey Cutting 200 mg at 1500         sitaGLIPtin (JANUVIA) 100 mg tablet Take 100 mg by mouth daily           No discharge procedures on file  ED Provider  Attending physically available and evaluated Yubrian Lafleur  CARMELO managed the patient along with the ED Attending      Electronically Signed by         Raji Welch DO  Resident  12/11/17 0150

## 2017-12-10 NOTE — ED PROVIDER NOTES
History  No chief complaint on file  HPI    None       No past medical history on file  No past surgical history on file  No family history on file  I have reviewed and agree with the history as documented  Social History   Substance Use Topics    Smoking status: Not on file    Smokeless tobacco: Not on file    Alcohol use Not on file        Review of Systems    Physical Exam  ED Triage Vitals   Temp Pulse Resp BP SpO2   -- -- -- -- --      Temp src Heart Rate Source Patient Position - Orthostatic VS BP Location FiO2 (%)   -- -- -- -- --      Pain Score       --           Orthostatic Vital Signs  There were no vitals filed for this visit  Physical Exam    ED Medications  Medications - No data to display    Diagnostic Studies  Results Reviewed     None                 No orders to display         Procedures  Procedures      Phone Consults  ED Phone Contact    ED Course  ED Course                                MDM  CritCare Time    Disposition  Final diagnoses:   None     ED Disposition     None      Follow-up Information    None       Patient's Medications    No medications on file     No discharge procedures on file  ED Provider  Attending physically available and evaluated Estee Howe I managed the patient along with the ED Attending      Electronically Signed by

## 2017-12-11 ENCOUNTER — APPOINTMENT (INPATIENT)
Dept: NON INVASIVE DIAGNOSTICS | Facility: HOSPITAL | Age: 57
DRG: 064 | End: 2017-12-11
Payer: MEDICARE

## 2017-12-11 ENCOUNTER — APPOINTMENT (INPATIENT)
Dept: RADIOLOGY | Facility: HOSPITAL | Age: 57
DRG: 064 | End: 2017-12-11
Payer: MEDICARE

## 2017-12-11 PROBLEM — N17.9 ACUTE KIDNEY INJURY (HCC): Status: ACTIVE | Noted: 2017-12-11

## 2017-12-11 LAB
ALBUMIN SERPL BCP-MCNC: 3.2 G/DL (ref 3.5–5)
ALP SERPL-CCNC: 109 U/L (ref 46–116)
ALT SERPL W P-5'-P-CCNC: 18 U/L (ref 12–78)
ANION GAP SERPL CALCULATED.3IONS-SCNC: 9 MMOL/L (ref 4–13)
AST SERPL W P-5'-P-CCNC: 11 U/L (ref 5–45)
ATRIAL RATE: 107 BPM
ATRIAL RATE: 81 BPM
ATRIAL RATE: 98 BPM
BILIRUB SERPL-MCNC: 0.27 MG/DL (ref 0.2–1)
BUN SERPL-MCNC: 23 MG/DL (ref 5–25)
CALCIUM SERPL-MCNC: 9.3 MG/DL (ref 8.3–10.1)
CHLORIDE SERPL-SCNC: 100 MMOL/L (ref 100–108)
CO2 SERPL-SCNC: 27 MMOL/L (ref 21–32)
CREAT SERPL-MCNC: 1.45 MG/DL (ref 0.6–1.3)
ERYTHROCYTE [DISTWIDTH] IN BLOOD BY AUTOMATED COUNT: 13.8 % (ref 11.6–15.1)
GFR SERPL CREATININE-BSD FRML MDRD: 45 ML/MIN/1.73SQ M
GLUCOSE SERPL-MCNC: 120 MG/DL (ref 65–140)
GLUCOSE SERPL-MCNC: 158 MG/DL (ref 65–140)
GLUCOSE SERPL-MCNC: 161 MG/DL (ref 65–140)
GLUCOSE SERPL-MCNC: 167 MG/DL (ref 65–140)
GLUCOSE SERPL-MCNC: 175 MG/DL (ref 65–140)
GLUCOSE SERPL-MCNC: 227 MG/DL (ref 65–140)
GLUCOSE SERPL-MCNC: 236 MG/DL (ref 65–140)
GLUCOSE SERPL-MCNC: 245 MG/DL (ref 65–140)
GLUCOSE SERPL-MCNC: 293 MG/DL (ref 65–140)
GLUCOSE SERPL-MCNC: 313 MG/DL (ref 65–140)
GLUCOSE SERPL-MCNC: 374 MG/DL (ref 65–140)
GLUCOSE SERPL-MCNC: 389 MG/DL (ref 65–140)
GLUCOSE SERPL-MCNC: 490 MG/DL (ref 65–140)
HCT VFR BLD AUTO: 35 % (ref 34.8–46.1)
HGB BLD-MCNC: 11.2 G/DL (ref 11.5–15.4)
INR PPP: 1.11 (ref 0.86–1.16)
MAGNESIUM SERPL-MCNC: 2 MG/DL (ref 1.6–2.6)
MCH RBC QN AUTO: 27.8 PG (ref 26.8–34.3)
MCHC RBC AUTO-ENTMCNC: 32 G/DL (ref 31.4–37.4)
MCV RBC AUTO: 87 FL (ref 82–98)
P AXIS: -20 DEGREES
P AXIS: 186 DEGREES
P AXIS: 77 DEGREES
PHOSPHATE SERPL-MCNC: 2.8 MG/DL (ref 2.7–4.5)
PLATELET # BLD AUTO: 222 THOUSANDS/UL (ref 149–390)
PMV BLD AUTO: 11.2 FL (ref 8.9–12.7)
POTASSIUM SERPL-SCNC: 3.8 MMOL/L (ref 3.5–5.3)
PR INTERVAL: 118 MS
PR INTERVAL: 138 MS
PR INTERVAL: 168 MS
PROT SERPL-MCNC: 6.9 G/DL (ref 6.4–8.2)
PROTHROMBIN TIME: 14.3 SECONDS (ref 12.1–14.4)
QRS AXIS: -15 DEGREES
QRS AXIS: -5 DEGREES
QRS AXIS: 3 DEGREES
QRSD INTERVAL: 94 MS
QRSD INTERVAL: 94 MS
QRSD INTERVAL: 96 MS
QT INTERVAL: 360 MS
QT INTERVAL: 406 MS
QT INTERVAL: 442 MS
QTC INTERVAL: 480 MS
QTC INTERVAL: 513 MS
QTC INTERVAL: 518 MS
RBC # BLD AUTO: 4.03 MILLION/UL (ref 3.81–5.12)
SODIUM SERPL-SCNC: 136 MMOL/L (ref 136–145)
T WAVE AXIS: 0 DEGREES
T WAVE AXIS: 58 DEGREES
T WAVE AXIS: 66 DEGREES
TROPONIN I SERPL-MCNC: 0.46 NG/ML
TROPONIN I SERPL-MCNC: 0.47 NG/ML
VENTRICULAR RATE: 107 BPM
VENTRICULAR RATE: 81 BPM
VENTRICULAR RATE: 98 BPM
WBC # BLD AUTO: 9.91 THOUSAND/UL (ref 4.31–10.16)

## 2017-12-11 PROCEDURE — 93005 ELECTROCARDIOGRAM TRACING: CPT

## 2017-12-11 PROCEDURE — 84100 ASSAY OF PHOSPHORUS: CPT | Performed by: INTERNAL MEDICINE

## 2017-12-11 PROCEDURE — 85610 PROTHROMBIN TIME: CPT | Performed by: INTERNAL MEDICINE

## 2017-12-11 PROCEDURE — 93306 TTE W/DOPPLER COMPLETE: CPT

## 2017-12-11 PROCEDURE — C9113 INJ PANTOPRAZOLE SODIUM, VIA: HCPCS | Performed by: PHYSICIAN ASSISTANT

## 2017-12-11 PROCEDURE — 85027 COMPLETE CBC AUTOMATED: CPT | Performed by: INTERNAL MEDICINE

## 2017-12-11 PROCEDURE — 80053 COMPREHEN METABOLIC PANEL: CPT | Performed by: INTERNAL MEDICINE

## 2017-12-11 PROCEDURE — 83735 ASSAY OF MAGNESIUM: CPT | Performed by: INTERNAL MEDICINE

## 2017-12-11 PROCEDURE — 84484 ASSAY OF TROPONIN QUANT: CPT | Performed by: INTERNAL MEDICINE

## 2017-12-11 PROCEDURE — 70551 MRI BRAIN STEM W/O DYE: CPT

## 2017-12-11 PROCEDURE — 82948 REAGENT STRIP/BLOOD GLUCOSE: CPT

## 2017-12-11 RX ORDER — HYDRALAZINE HYDROCHLORIDE 20 MG/ML
10 INJECTION INTRAMUSCULAR; INTRAVENOUS EVERY 6 HOURS SCHEDULED
Status: DISCONTINUED | OUTPATIENT
Start: 2017-12-11 | End: 2017-12-13

## 2017-12-11 RX ORDER — INSULIN GLARGINE 100 [IU]/ML
50 INJECTION, SOLUTION SUBCUTANEOUS EVERY 12 HOURS SCHEDULED
Status: DISCONTINUED | OUTPATIENT
Start: 2017-12-11 | End: 2017-12-12

## 2017-12-11 RX ORDER — ASPIRIN 81 MG/1
81 TABLET, CHEWABLE ORAL DAILY
Status: DISCONTINUED | OUTPATIENT
Start: 2017-12-11 | End: 2017-12-15 | Stop reason: HOSPADM

## 2017-12-11 RX ADMIN — ENOXAPARIN SODIUM 40 MG: 40 INJECTION SUBCUTANEOUS at 09:04

## 2017-12-11 RX ADMIN — ASPIRIN 81 MG 81 MG: 81 TABLET ORAL at 12:11

## 2017-12-11 RX ADMIN — Medication 11 UNITS/HR: at 10:10

## 2017-12-11 RX ADMIN — DOCUSATE SODIUM 100 MG: 100 CAPSULE, LIQUID FILLED ORAL at 09:03

## 2017-12-11 RX ADMIN — CLONIDINE HYDROCHLORIDE 0.2 MG: 0.2 TABLET ORAL at 09:03

## 2017-12-11 RX ADMIN — METOPROLOL SUCCINATE 50 MG: 50 TABLET, EXTENDED RELEASE ORAL at 09:03

## 2017-12-11 RX ADMIN — INSULIN GLARGINE 50 UNITS: 100 INJECTION, SOLUTION SUBCUTANEOUS at 21:38

## 2017-12-11 RX ADMIN — ACETAMINOPHEN 650 MG: 325 TABLET, FILM COATED ORAL at 10:49

## 2017-12-11 RX ADMIN — INSULIN LISPRO 16 UNITS: 100 INJECTION, SOLUTION INTRAVENOUS; SUBCUTANEOUS at 17:19

## 2017-12-11 RX ADMIN — SENNOSIDES 8.6 MG: 8.6 TABLET, FILM COATED ORAL at 09:03

## 2017-12-11 RX ADMIN — CLOPIDOGREL BISULFATE 75 MG: 75 TABLET ORAL at 09:03

## 2017-12-11 RX ADMIN — Medication 16 UNITS/HR: at 19:45

## 2017-12-11 RX ADMIN — LEVETIRACETAM 1000 MG: 500 TABLET ORAL at 21:38

## 2017-12-11 RX ADMIN — INSULIN LISPRO 6 UNITS: 100 INJECTION, SOLUTION INTRAVENOUS; SUBCUTANEOUS at 23:58

## 2017-12-11 RX ADMIN — LEVETIRACETAM 1000 MG: 500 TABLET ORAL at 09:03

## 2017-12-11 RX ADMIN — PHENYTOIN SODIUM 100 MG: 100 CAPSULE ORAL at 09:05

## 2017-12-11 RX ADMIN — DOCUSATE SODIUM 100 MG: 100 CAPSULE, LIQUID FILLED ORAL at 17:25

## 2017-12-11 RX ADMIN — PANTOPRAZOLE SODIUM 40 MG: 40 INJECTION, POWDER, FOR SOLUTION INTRAVENOUS at 09:04

## 2017-12-11 NOTE — ED ATTENDING ATTESTATION
Tho Merritt MD, saw and evaluated the patient  I have discussed the patient with the resident/non-physician practitioner and agree with the resident's/non-physician practitioner's findings, Plan of Care, and MDM as documented in the resident's/non-physician practitioner's note, except where noted  All available labs and Radiology studies were reviewed  At this point I agree with the current assessment done in the Emergency Department  I have conducted an independent evaluation of this patient a history and physical is as follows: The patient presents from the [facility] with complaints of altered mental status  The patient went to the [facility] at approximately 12:00 p m  on a bus from [place] she was in her usual state of health at that time  The patient is insulin-dependent diabetic who has not taken any of her insulin for the last 7 days, she has also not taken any of her blood pressure medicines for the last 7 days  She presents with complaints of headache and feeling foggy in her head she has had polyuria and polydipsia she has had no fever or chills  No head injury  No visual changes  She has had nausea and vomiting  No chest pain or shortness of breath and no abdominal pain   On exam the patient is awake alert her blood pressure is quite elevated in the 230/115 range   pupils are equal and reactive mucous membranes are dry no [place] respirations are noted   lungs clear heart mildly tachycardic with no murmurs abdomen soft and nontender extremities edema bilaterally mild  Blood sugar noted to be greater than 500  Altered mental status with hypertensive urgency  Planned CT head lab work to rule out DKA cardiac labs as well  Patient's blood pressure was treated with 2 doses of IV labetalol  CT scan of head negative for bleed  EKG no acute ischemic changes troponin negative  Lab workup reveals elevated blood sugar however no evidence of DKA  Patient was treated with IV fluids and a dose of IV insulin and admitted to the step-down 1 unit      Critical Care Time  The patient presented with a condition in which there was a high probability of imminent or life-threatening deterioration, and critical care services (excluding separately billable procedures) totalled 30-74 minutes

## 2017-12-11 NOTE — CONSULTS
Consultation - Cardiology Team One  Lizabeth Najjar 59 y o  female MRN: [id number]  Unit/Bed#: [facility] 531-01 Encounter: [id number]    Inpatient consult to Cardiology  Consult performed by: Jolynn Valera ordered by: Wes Rogers          Physician Requesting Consult: Kulwant Winter MD  Reason for Consult / Principal Problem:   Elevated troponin  Chief Complaint   Patient presents with    Altered Mental Status       Patient found staring off at slot machine at casino at 1600  Patient found with BS of over 600 by EMS  Patient here from [place] on a bus trip  Patient also has no had her medications since last week due to "grand-daughter did not pick them up "       History of Present Illness   HPI: Lizabeth Najjar is a 59y o  year old female who has a history of hypertension, hyperlipidemia, morbid obesity, obstructive sleep apnea, diabetes and a seizure disorder who presented from the [facility] with mental status changes  Her blood sugar was reportedly greater than 600  She admitted to noncompliance with her medications  She tells me she was without her medications for 1 week    In the emergency room, her  blood pressure was  225/132  Her creatinine was 1 4, BUN 23  Glucose 552   Troponin 0 46, trending down  EKG:  Sinus tachycardia 107 beats per minute  Criteria for LVH were noted  QTC was prolonged at 518 milliseconds  Chest x-ray and CT of the head, showed nothing acute  She was treated with generous IV fluids, IV labetalol, & IV hydralazine  Her blood pressure is improved    Cardiology is consulted regarding the elevated troponin  She tells me she has a cardiologist in [place]  She apparently had a heart attack about a year ago  She denies any cardiac stenting or open heart surgery    She is very sleepy  She is a poor historian in this regard  The patient tells me she "feels better" now  Admits to fatigue  She had been dizzy Although that has improved    She denies chest pain, or shortness of breath  She does have a mask for her sleep apnea , but believes she lost it  Social history:  She resides in [place], with her significant other  She denies smoking  She denies illicit drug use    Review of Systems   Constitution: Positive for weakness and malaise/fatigue  Negative for chills and fever  Cardiovascular: Positive for leg swelling  Negative for chest pain, claudication, cyanosis, dyspnea on exertion, irregular heartbeat, near-syncope, orthopnea, paroxysmal nocturnal dyspnea and syncope  Respiratory: Positive for sleep disturbances due to breathing and snoring  Negative for cough and shortness of breath  Gastrointestinal: Negative for heartburn, nausea and vomiting  Neurological: Positive for excessive daytime sleepiness  Negative for dizziness, focal weakness, headaches and light-headedness  All other systems reviewed and are negative  Historical Information   Past Medical History:   Diagnosis Date    Cardiac disease     [facility] )     [facility]     Diabetes mellitus ([place] [address] )     Hypertension     Seizures ([place] [address] )      Past Surgical History:   Procedure Laterality Date    CARDIAC CATHETERIZATION  12/2017    R groin access, no intervention    CHOLECYSTECTOMY       History   Alcohol Use No     History   Drug Use No     History   Smoking Status    Never Smoker   Smokeless Tobacco    Never Used     Family History: History reviewed  No pertinent family history      Meds/Allergies   all current active meds have been reviewed, current meds:   Current Facility-Administered Medications   Medication Dose Route Frequency    acetaminophen (TYLENOL) tablet 650 mg  650 mg Oral Q6H PRN    acetaminophen (TYLENOL) tablet 975 mg  975 mg Oral Q6H PRN    aluminum-magnesium hydroxide-simethicone (MYLANTA) 200-200-20 mg/5 mL oral suspension 30 mL  30 mL Oral Q6H PRN    calcium carbonate (TUMS) chewable tablet 1,000 mg  1,000 mg Oral TID PRN    cloNIDine (CATAPRES) tablet 0 2 mg  0 2 mg Oral BID    clopidogrel (PLAVIX) tablet 75 mg  75 mg Oral Daily    docusate sodium (COLACE) capsule 100 mg  100 mg Oral BID    enoxaparin (LOVENOX) subcutaneous injection 40 mg  40 mg Subcutaneous Daily    hydrALAZINE (APRESOLINE) injection 10 mg  10 mg Intravenous Q6H [address]    insulin lispro (HumaLOG) 100 units/mL subcutaneous injection 10 Units  10 Units Subcutaneous Once    insulin regular (HumuLIN R,NovoLIN R) 1 Units/mL in sodium chloride 0 9 % 100 mL infusion  0 3-21 Units/hr Intravenous Titrated    levETIRAcetam (KEPPRA) tablet 1,000 mg  1,000 mg Oral Q12H [address]    metoprolol succinate (TOPROL-XL) 24 hr tablet 50 mg  50 mg Oral Daily    ondansetron (ZOFRAN) injection 4 mg  4 mg Intravenous Q6H PRN    pantoprazole (PROTONIX) injection 40 mg  40 mg Intravenous Q24H NISH    phenytoin (DILANTIN) ER capsule 100 mg  100 mg Oral TID    phenytoin (DILANTIN) ER capsule 100 mg  100 mg Oral HS    senna (SENOKOT) tablet 8 6 mg  1 tablet Oral Daily    and PTA meds:   Prior to Admission Medications   Prescriptions Last Dose Informant Patient Reported? Taking?    LEVETIRACETAM PO  Family Member Yes Yes   Sig: Take 100 mg by mouth 2 (two) times a day   aspirin (ECOTRIN LOW STRENGTH) 81 mg EC tablet  Self Yes Yes   Sig: Take 81 mg by mouth 2 (two) times a day     atorvastatin (LIPITOR) 40 mg tablet  Family Member Yes Yes   Sig: Take 40 mg by mouth daily   cloNIDine (CATAPRES) 0 2 mg tablet  Family Member Yes Yes   Sig: Take 0 2 mg by mouth 2 (two) times a day   clopidogrel (PLAVIX) 75 mg tablet  Family Member Yes Yes   Sig: Take 75 mg by mouth daily   insulin aspart (NovoLOG) 100 units/mL injection  Family Member Yes Yes   Sig: Inject 16 Units under the skin 2 (two) times a day before meals   insulin glargine (LANTUS) 100 units/mL subcutaneous injection  Family Member Yes Yes   Sig: Inject 50 Units under the skin 2 (two) times a day levocetirizine (XYZAL) 5 MG tablet  Family Member Yes Yes   Sig: Take 5 mg by mouth every evening   lisinopril (ZESTRIL) 2 5 mg tablet  Family Member Yes Yes   Sig: Take 2 5 mg by mouth daily   metFORMIN (GLUCOPHAGE) 1000 MG tablet  Family Member Yes Yes   Sig: Take 1,000 mg by mouth 2 (two) times a day with meals   metoprolol succinate (TOPROL-XL) 50 mg 24 hr tablet  Family Member Yes Yes   Sig: Take 50 mg by mouth daily   phenytoin (DILANTIN) 100 mg ER capsule  Family Member Yes Yes   Sig: Take 100 mg by mouth 3 (three) times a day 100 mg at 0900 and 2100    Liv Ormond 200 mg at 1500      sitaGLIPtin (JANUVIA) 100 mg tablet  Family Member Yes Yes   Sig: Take 100 mg by mouth daily      Facility-Administered Medications: None       insulin regular (HumuLIN R,NovoLIN R) infusion 0 3-21 Units/hr Last Rate: 4 Units/hr (17 0610)       No Known Allergies    Objective   Vitals: Blood pressure 106/58, pulse 79, temperature 98 7 °F (37 1 °C), temperature source Axillary, resp  rate 15, height 5' 4" (1 626 m), weight 109 kg (240 lb 3 2 oz), SpO2 96 %  ,     Body mass index is 41 23 kg/m²  ,     Systolic (44ALM), CFF:517 , Min:106 , USH:789     Diastolic (84ERZ), ZW, Min:54, Max:132        Intake/Output Summary (Last 24 hours) at 17 1009  Last data filed at 17 0801   Gross per 24 hour   Intake           2533 3 ml   Output              400 ml   Net           2133 3 ml     Weight (last 2 days)     Date/Time   Weight    12/10/17 2111  109 (240 2)            Invasive Devices     Peripheral Intravenous Line            Peripheral IV 12/10/17 Right Antecubital 1 day                  Physical Exam   Constitutional: She appears lethargic  No distress  HENT:   Head: Normocephalic and atraumatic  Eyes: Conjunctivae and EOM are normal    Neck: Normal range of motion  Neck supple  Cardiovascular: Normal rate, regular rhythm and intact distal pulses  Exam reveals distant heart sounds      Pulmonary/Chest: Effort normal  She has decreased breath sounds  Abdominal: Soft  Bowel sounds are normal    Musculoskeletal: She exhibits edema  Neurological: She appears lethargic  Appears lethargic and sleepy  Response to voice commands   Skin: Skin is warm and dry  She is not diaphoretic  Nursing note and vitals reviewed          LABORATORY RESULTS:    Results from last 7 days  Lab Units 12/11/17  0436 12/11/17  0102 12/10/17  2128   TROPONIN I ng/mL 0 46* 0 47* 0 49*     CBC with diff:   Results from last 7 days  Lab Units 12/11/17  0437 12/10/17  2128 12/10/17  1754 12/10/17  1730   WBC Thousand/uL 9 91  --   --  8 59   HEMOGLOBIN g/dL 11 2*  --   --  12 2   I STAT HEMOGLOBIN g/dl  --   --  13 3  --    HEMATOCRIT % 35 0  --   --  39 1   MCV fL 87  --   --  87   PLATELETS Thousands/uL 222 253  --  251   MCH pg 27 8  --   --  27 2   MCHC g/dL 32 0  --   --  31 2*   RDW % 13 8  --   --  13 9   MPV fL 11 2 11 9  --  11 6   NRBC AUTO /100 WBCs  --   --   --  0       CMP:  Results from last 7 days  Lab Units 12/11/17  0437 12/10/17  1754 12/10/17  1730   SODIUM mmol/L 136  --  136   POTASSIUM mmol/L 3 8  --  4 4   CHLORIDE mmol/L 100  --  98*   CO2 mmol/L 27  --  32   ANION GAP mmol/L 9  --  6   BUN mg/dL 23  --  23   CREATININE mg/dL 1 45*  --  1 44*   GLUCOSE RANDOM mg/dL 227*  --  552*   GLUCOSE, ISTAT mg/dl  --  553*  --    CALCIUM mg/dL 9 3  --  9 5   AST U/L 11  --  10   ALT U/L 18  --  25   ALK PHOS U/L 109  --  143*   TOTAL PROTEIN g/dL 6 9  --  8 1   ALBUMIN g/dL 3 2*  --  3 6   BILIRUBIN TOTAL mg/dL 0 27  --  0 27   EGFR ml/min/1 73sq m 45 52 46       BMP:  Results from last 7 days  Lab Units 12/11/17  0437  12/10/17  1730   SODIUM mmol/L 136  --  136   POTASSIUM mmol/L 3 8  --  4 4   CHLORIDE mmol/L 100  --  98*   CO2 mmol/L 27  --  32   BUN mg/dL 23  --  23   CREATININE mg/dL 1 45*  --  1 44*   GLUCOSE RANDOM mg/dL 227*  --  552*   GLUCOSE, ISTAT   --   < >  --    CALCIUM mg/dL 9 3  --  9 5   < > = values in this interval not displayed  Results from last 7 days  Lab Units 12/10/17  1730   NT-PRO BNP pg/mL 787*           Results from last 7 days  Lab Units 12/11/17  0437 12/10/17  1730   MAGNESIUM mg/dL 2 0 2 1          Results from last 7 days  Lab Units 12/10/17  2128   HEMOGLOBIN A1C % 11 2*          Results from last 7 days  Lab Units 12/10/17  2128   TSH 3RD GENERATON uIU/mL 0 531         Results from last 7 days  Lab Units 12/11/17  0455   INR  1 11       Imaging: I have personally reviewed pertinent reports  and I have personally reviewed pertinent films in PACS  Xr Chest 2 Views  Result Date: 12/11/2017  Narrative: CHEST INDICATION:  Chest pain, shortness of breath and vomiting  COMPARISON:  None VIEWS:  Frontal and lateral projections IMAGES:  2 FINDINGS:     The heart is enlarged  The pulmonary vessels appear normal  The lungs and pleural spaces are clear  Visualized osseous structures appear within normal limits for the patient's age  Impression: No acute abnormality in the chest  Workstation performed: [id number]     Ct Head Without Contrast  Result Date: 12/10/2017  Narrative: CT BRAIN - WITHOUT CONTRAST INDICATION:  Headache  Dizziness  Hypertension  COMPARISON:  None  TECHNIQUE:  CT examination of the brain was performed  In addition to axial images, coronal reformatted images were created and submitted for interpretation  Radiation dose length product (DLP) for this visit:  993 mGy-cm   This examination, like all CT scans performed in the [facility], was performed utilizing techniques to minimize radiation dose exposure, including the use of iterative reconstruction and automated exposure control  IMAGE QUALITY:  Diagnostic  FINDINGS:  PARENCHYMA:  Decreased attenuation is noted in the supratentorial white matter demonstrating an appearance most consistent with mild microangiopathic change  No intracranial mass, mass effect or midline shift  No CT signs of acute infarction    There is no parenchymal hemorrhage  VENTRICLES AND EXTRA-AXIAL SPACES:  Normal for patient's age  VISUALIZED ORBITS AND PARANASAL SINUSES:  Unremarkable  CALVARIUM AND EXTRACRANIAL SOFT TISSUES:   Normal    Impression: No acute intracranial abnormality  Workstation performed: [id number]         Telemetry reviewed personally:  Sinus rhythm, 70 to 80s  Assessment  Principal Problem:    Hypertensive urgency  Active Problems:    COPD (chronic obstructive pulmonary disease) (HCC)    Altered mental status    Cardiac disease    CHF (congestive heart failure) (HCC)    Diabetes mellitus (HCC)    Hypertension    Seizures (HCC)      Assessment  Elevated troponin to 0 4 9, trending down  This is a type 2 non STEMI secondary to uncontrolled hypertension  CAD-her anatomy at this time is unknown  Hypertension  Average 174/85  Notably, the patient was noncompliant with her medications  Hyperlipidemia  Diabetes mellitus, type 2  Uncontrolled  Hemoglobin A1c  Treatment per slim   Acute kidney injury    Plan  Repeat an EKG to check Qtc  Will order an echocardiogram  Will request records from her usual cardiologist; [facility] Nelly Gen[facility]  She was educated regarding the deleterious effects of stopping her medications and suggested that she not do so in the future  She should be restarted on her outpatient cardiac medications  These include aspirin 81 mg daily, Plavix 75 mg daily, Lipitor 40 mg daily and Toprol-XL 50 mg daily  Would resume the Ace inhibitor as her renal function and blood pressure permit  Thank you for allowing us to participate in this patient's care  This pt will follow up with Dr Peter Velasquez once discharged  Counseling / Coordination of Care  Total floor / unit time spent today 45 minutes  Greater than 50% of total time was spent with the patient and / or family counseling and / or coordination of care    A description of the counseling / coordination of care: Review of history, current assessment, development of a plan  Code Status: Level 1 - Full Code    ** Please Note: Dragon 360 Dictation voice to text software may have been used in the creation of this document   **

## 2017-12-11 NOTE — ASSESSMENT & PLAN NOTE
· Creatinine 1 44 on admission, 1 45 today  · Unclear baseline, unclear if patient has chronic kidney disease  · Continue to monitor  · Avoid nephrotoxins  · Repeat BMP in the morning

## 2017-12-11 NOTE — SOCIAL WORK
CM met with patient and SO Tonja Townsend [phone] at bedside  Pt states that she stays with SO in a 13th floor apartment with an elevator  Pt states that she has no DME  Previous HHC in August 2017 unsure of agency  No history of STR  Preferred Rx [address] in [place] [address]  Pt attends adult day care daily [facility] on [address] in [place] [address]  Adult day-care provide transport to patient's doctor appointments  Pt denies history MH or D/A issues  CM reviewed d/c planning process including the following: identifying help at home, patient preference for d/c planning needs, Discharge Lounge, [facility] Meds to Bed program, availability of treatment team to discuss questions or concerns patient and/or family may have regarding understanding medications and recognizing signs and symptoms once discharged  CM also encouraged patient to follow up with all recommended appointments after discharge  Patient advised of importance for patient and family to participate in managing patients medical well being

## 2017-12-11 NOTE — ASSESSMENT & PLAN NOTE
· Present on admission as evidenced by altered mental status, staring   Suspect multifactorial secondary to noncompliance with medications, uncontrolled diabetes, seizure disorder, hypertensive urgency  · Continue with current management/plan of care  · Monitor neurologic status  · Monitor blood pressure closely  · Monitor blood sugars closely  · Repeat CBC and BMP tomorrow

## 2017-12-11 NOTE — ED NOTES
Per SLIM patient now to be a level 2 step down patient  PAC center was advised by dr Rosa Park himself         Kroey Arndt RN  12/10/17 2026

## 2017-12-11 NOTE — ASSESSMENT & PLAN NOTE
· Present on admission suspect secondary to noncompliance with medications  · Appreciate Cardiology consultation  · Continue clonidine, metoprolol and p r n   Hydralazine  · Monitor BP closely

## 2017-12-11 NOTE — H&P
H&P Exam - China Corey 59 y o  female MRN: [id number]    Unit/Bed#: ED 27 Encounter: [id number]      Assessment/Plan     Assessment:  Hypertensive emergency  Type 2 DM with Hyperglycemia   CAD  Altered mental status  COPD  CHF      Plan:  Admit to med/surg  telemetry monitoring  STEP down level 2 as patient has hypertensive urgency and hyperglycemia needing insulin       Hypertensive  emergency: patient is noncompliant and did not take her medications today  She came to visit [facility] without her medications  Receive labetolol x 2 ivp  Will start nitro paste 1 inch  Also hydralazine 10 mg ivp every 6 hrs for SBP>170  Telemetry monitoring  Discussed with icu team    Type 2 DM Uncontrolled blood glucose  Starting on insulin drip  Monitor fingerstick glucose every 2 hrly  Check BMP, mg and phos every 6 hrly    CAD: unsure what patient is on, would start with asprin and atorvastatin    Altered mental status: patient has improved and continue to monitor   Seizure precaution  Keppra 100 mg bid   Dilantin 200 mg at night and 100 mg morning  Monitor dilantin levels  Neurology consult  Pharmacy needs to be called in the am for medications adjustments    CHF: unsure if she has diastolic or systolic CHF  Will get a ECHO in the morning   Start lasix 20 mg now  ROSELAI: unsure of her baseline, would continue with fluids and lasix  BMP in am      History of Present Illness      HPI:  China Corey is a 59 y o  female who presents with altered mental status  She was visiting [address] [place] [address] from [place]  She has for gotten all of her medications  She was found to have altered mental status and staring  In the emergency room she was found to have blood pressure of 225/115, with some heart rate of 103, blood glucose of 553  She was given labetalol x2 IV P, an insulin lispro  Her blood pressure has come down to SBP of 180  She was also noted to have the acute kidney injury    We have given her IV fluids because of hyperglycemia  She will be admitted to step-down level 2  Critical Care has seen patient  Review of Systems   Constitutional: Positive for activity change, appetite change and fatigue  Negative for chills, diaphoresis, fever and unexpected weight change  HENT: Negative  Eyes: Negative  Respiratory: Negative  Cardiovascular: Negative  Gastrointestinal: Negative  Endocrine: Negative  Genitourinary: Negative  Musculoskeletal: Negative  Skin: Negative  Allergic/Immunologic: Negative  Neurological: Positive for headaches  Hematological: Negative  Psychiatric/Behavioral: Negative  Historical Information   Past Medical History:   Diagnosis Date    Cardiac disease     [facility] failure) ([facility] )     [facility]     Diabetes mellitus ([facility] )     Hypertension     Seizures ([facility])      Past Surgical History:   Procedure Laterality Date    CHOLECYSTECTOMY       Social History   History   Alcohol Use No     History   Drug Use No     History   Smoking Status    Never Smoker   Smokeless Tobacco    Never Used     Family History: non-contributory    Meds/Allergies   all medications and allergies reviewed  No Known Allergies    Objective   Vitals: Blood pressure (!) 219/108, pulse 94, temperature (!) 97 °F (36 1 °C), temperature source Tympanic, resp  rate 20, SpO2 90 %  Intake/Output Summary (Last 24 hours) at 12/10/17 1920  Last data filed at 12/10/17 1800   Gross per 24 hour   Intake             1000 ml   Output                0 ml   Net             1000 ml       Invasive Devices          No matching active lines, drains, or airways          Physical Exam    Lab Results: I have personally reviewed pertinent reports  Imaging: I have personally reviewed pertinent reports  EKG, Pathology, and Other Studies: I have personally reviewed pertinent reports        Code Status: No Order  Advance Directive and Living Will: Power of :    POLST:      Counseling / Coordination of Care  Total floor / unit time spent today 35 minutes  Greater than 50% of total time was spent with the patient and / or family counseling and / or coordination of care  A description of the counseling / coordination of care: Hypertensive emergency

## 2017-12-11 NOTE — CONSULTS
Consultation - Neurology   Jostin Gold 59 y o  female MRN: [id number]  Unit/Bed#: [facility] 531-01 Encounter: [id number]    Assessment/Plan   75-year-old female with past neurological history reported to be prior stroke and prior history of seizures  They report her seizures to be staring into the distance with loss of awareness eyes rolled back and head shaking and trembling of the mouth and upper extremities     She is maintained on Keppra and Dilantin  She is noncompliant with his medication  She was at the [employer], was found to to be staring into the distance unresponsive  She was brought to [address] [place] [address] where she was found to be severely hypertensive with elevated blood sugars, she was also found to have elevated troponin and BNP, and in ROSELIA  She has not been compliant with her BP and BS medications  Current she is complaining of headache and dizziness, she denies any other neurological complaints  Neurological examination as below - positive for cognitive slowing, poor recall, and babinski on the left  This appears to be a toxic metabolic encephalopathy secondary to increased blood pressure / cannot exclude pres, ROSELIA, Elevated BNP, and BS  She may have also suffered a provoked seizure secondary to above and medication non-compliance  -  MRI of brain with out contrast - to evaluate for PRES and for small infarction  -  Check Dilantin level  -  Continue home medications of AEDs and medication compliance with close follow up with neurology,they report these medications were reviewed with pharmacy dosing and timing   -  Treat underlying toxic and metabolic derangements  -  Seizure precautions  -  Notify neurology with any changes  -  Attending to see and advise  History of Present Illness     Reason for Consult / Principal Problem: Encephalopathy    HPI: Jostin Gold is a 59 y  o female who presents with change in mental status    Patient is a resident [facility] she has a neurological hx significant for prior stroke and seizure disorder  She is unable to tell me exactly her symptoms and what side was affected when she had her stroke approximately 2 years ago  She is a poor historian  She does report that she has had a history of seizures  She had her 1st seizure a year ago, she reports she has had about 7 seizures total   A cluster of 5 at one point and a few months ago 2 seizures back-to-back  She describes her seizures as staring office distance, eyes rolled back her head, twitching of her mouth/mouth movements, upper and lower extremity twitching, lasting seconds to few minutes, all by a feeling being very tired  She reports she did see a neurologist in the, and she is on 2 medications  She was able to recognize the medications with stated to them she is unable to tell me dosing  She reports that though the dosing in the chart is correct, this was reviewed with pharmacy per patient and significant other  Keppra and Dilantin, Dilantin level pending  She does report that she was not taking seizure medications as prescribed, her significant other agrees with this  She has been noncompliant  On arrival to the ED the patient's blood pressure was 225/115, blood sugar 552  Also found have elevated BNP, elevated troponin, and she was in [place]  Per reports she was at [facility] between 15 and 200 a trip, toward the end of the trip, she felt dizzy and could not see  It was reported the patient was sitting down, when seen by her significant other, eyes rolled back and had loss of awareness  There is no twitching of the upper or lower extremities reported  There is no bowel or bladder incontinence  Arrival she was complaining of generalized headache  Current she is reporting a mild headache and mild dizziness sensation, otherwise she denies any other complaints      Inpatient consult to Neurology  Consult performed by: Prema Peralta ordered by: MEENA CYR          Review of Systems   Constitutional: Negative  HENT: Negative  Eyes: Positive for visual disturbance  Respiratory: Negative  Cardiovascular: Negative  Gastrointestinal: Negative  Endocrine: Negative  Genitourinary: Negative  Musculoskeletal: Negative  Skin: Negative  Neurological: Positive for dizziness and headaches  Negative for tremors, syncope, facial asymmetry, speech difficulty, weakness, light-headedness and numbness  Hematological: Negative  Psychiatric/Behavioral: Negative  Historical Information   Past Medical History:   Diagnosis Date    Cardiac disease     CHF (congestive heart failure) ([place] [address] )     COPD (chronic obstructive pulmonary disease) (formerly [facility])     Diabetes mellitus ([place] [address] )     Hypertension     Seizures ([place] [address] )      Past Surgical History:   Procedure Laterality Date    CARDIAC CATHETERIZATION  12/2017    R groin access, no intervention    CHOLECYSTECTOMY       Social History   History   Alcohol Use No     History   Drug Use No     History   Smoking Status    Never Smoker   Smokeless Tobacco    Never Used     Family History: History reviewed  No pertinent family history  Review of previous medical records was completed  No records found, hx by patient and patient's significant other       Meds/Allergies   all current active meds have been reviewed, current meds:   Current Facility-Administered Medications   Medication Dose Route Frequency    acetaminophen (TYLENOL) tablet 650 mg  650 mg Oral Q6H PRN    acetaminophen (TYLENOL) tablet 975 mg  975 mg Oral Q6H PRN    aluminum-magnesium hydroxide-simethicone (MYLANTA) 200-200-20 mg/5 mL oral suspension 30 mL  30 mL Oral Q6H PRN    aspirin chewable tablet 81 mg  81 mg Oral Daily    calcium carbonate (TUMS) chewable tablet 1,000 mg  1,000 mg Oral TID PRN    cloNIDine (CATAPRES) tablet 0 2 mg  0 2 mg Oral BID    clopidogrel (PLAVIX) tablet 75 mg  75 mg Oral Daily    docusate sodium (COLACE) capsule 100 mg  100 mg Oral BID    enoxaparin (LOVENOX) subcutaneous injection 40 mg  40 mg Subcutaneous Daily    hydrALAZINE (APRESOLINE) injection 10 mg  10 mg Intravenous Q6H [address]    insulin lispro (HumaLOG) 100 units/mL subcutaneous injection 10 Units  10 Units Subcutaneous Once    insulin regular (HumuLIN R,NovoLIN R) 1 Units/mL in sodium chloride 0 9 % 100 mL infusion  0 3-21 Units/hr Intravenous Titrated    levETIRAcetam (KEPPRA) tablet 1,000 mg  1,000 mg Oral Q12H [address]    metoprolol succinate (TOPROL-XL) 24 hr tablet 50 mg  50 mg Oral Daily    ondansetron (ZOFRAN) injection 4 mg  4 mg Intravenous Q6H PRN    pantoprazole (PROTONIX) injection 40 mg  40 mg Intravenous Q24H NISH    phenytoin (DILANTIN) ER capsule 100 mg  100 mg Oral TID    phenytoin (DILANTIN) ER capsule 100 mg  100 mg Oral HS    senna (SENOKOT) tablet 8 6 mg  1 tablet Oral Daily    and PTA meds:   Prior to Admission Medications   Prescriptions Last Dose Informant Patient Reported? Taking?    LEVETIRACETAM PO  Family Member Yes Yes   Sig: Take 100 mg by mouth 2 (two) times a day   aspirin (ECOTRIN LOW STRENGTH) 81 mg EC tablet  Self Yes Yes   Sig: Take 81 mg by mouth 2 (two) times a day     atorvastatin (LIPITOR) 40 mg tablet  Family Member Yes Yes   Sig: Take 40 mg by mouth daily   cloNIDine (CATAPRES) 0 2 mg tablet  Family Member Yes Yes   Sig: Take 0 2 mg by mouth 2 (two) times a day   clopidogrel (PLAVIX) 75 mg tablet  Family Member Yes Yes   Sig: Take 75 mg by mouth daily   insulin aspart (NovoLOG) 100 units/mL injection  Family Member Yes Yes   Sig: Inject 16 Units under the skin 2 (two) times a day before meals   insulin glargine (LANTUS) 100 units/mL subcutaneous injection  Family Member Yes Yes   Sig: Inject 50 Units under the skin 2 (two) times a day   levocetirizine (XYZAL) 5 MG tablet  Family Member Yes Yes   Sig: Take 5 mg by mouth every evening   lisinopril (ZESTRIL) 2 5 mg tablet  Family Member Yes Yes Neurological: She has a normal Finger-Nose-Finger Test    Reflex Scores:       Tricep reflexes are 1+ on the right side and 1+ on the left side  Bicep reflexes are 1+ on the right side and 1+ on the left side  Brachioradialis reflexes are 1+ on the right side and 1+ on the left side  Patellar reflexes are 1+ on the right side and 1+ on the left side  Skin: She is not diaphoretic  Psychiatric:   Blunted affect   Vitals reviewed  Neurologic Exam     Mental Status   Registration: recalls 3 of 3 objects  Recall at 5 minutes: recalls 3 of 3 objects (0/3 recall)  Level of consciousness: alert  + cognitive slowing  Positive slow processing  [facility]  She was unable to read in a stroke card secondary to the size of font, been having problems with visual acuity  She had difficulty spelling world backwards, she is able to do simple calculations with more complex calculations she had problems with  She was able to recognize objects  No dysarthria, no apparent aphasia  She is able to repeat and name objects     Cranial Nerves     CN II   Visual fields full to confrontation  Visual acuity: (Decreased visual acuity bilaterally)    CN III, IV, VI   Pupils are equal, round, and reactive to light  Extraocular motions are normal    Nystagmus: none   Diplopia: none  Ophthalmoparesis: none    CN V   Facial sensation intact  CN VII   Facial expression full, symmetric  CN VIII   CN VIII normal      CN IX, X   CN IX normal    CN X normal      CN XI   CN XI normal      CN XII   CN XII normal      Motor Exam   Muscle bulk: normal  Overall muscle tone: normal  Right arm pronator drift: absent  Left arm pronator drift: absent    Strength   Strength 5/5 except as noted   4/5 hip flexors bilaterally     Sensory Exam   Light touch normal    Vibration normal    No lateralizing features to touch, temperature or vibration     Gait, Coordination, and Reflexes     Coordination   Finger to nose coordination: normal    Tremor   Resting tremor: absent  Intention tremor: absent  Action tremor: absent    Reflexes   Right brachioradialis: 1+  Left brachioradialis: 1+  Right biceps: 1+  Left biceps: 1+  Right triceps: 1+  Left triceps: 1+  Right patellar: 1+  Left patellar: 1+  Right plantar: normal  Left plantar: upgoing  Right Morrow: absent  Left Morrow: absent  Right ankle clonus: absent  Left ankle clonus: absentNo fasciculations or myoclonus noted     No evidence of seizure activity currently       Lab Results:    Results for orders placed or performed during the hospital encounter of 12/10/17   CBC and differential   Result Value Ref Range    WBC 8 59 4 31 - 10 16 Thousand/uL    RBC 4 48 3 81 - 5 12 Million/uL    Hemoglobin 12 2 11 5 - 15 4 g/dL    Hematocrit 39 1 34 8 - 46 1 %    MCV 87 82 - 98 fL    MCH 27 2 26 8 - 34 3 pg    MCHC 31 2 (L) 31 4 - 37 4 g/dL    RDW 13 9 11 6 - 15 1 %    MPV 11 6 8 9 - 12 7 fL    Platelets 315 391 - 586 Thousands/uL    nRBC 0 /100 WBCs    Neutrophils Relative 60 43 - 75 %    Lymphocytes Relative 33 14 - 44 %    Monocytes Relative 5 4 - 12 %    Eosinophils Relative 2 0 - 6 %    Basophils Relative 0 0 - 1 %    Neutrophils Absolute 5 20 1 85 - 7 62 Thousands/µL    Lymphocytes Absolute 2 80 0 60 - 4 47 Thousands/µL    Monocytes Absolute 0 40 0 17 - 1 22 Thousand/µL    Eosinophils Absolute 0 15 0 00 - 0 61 Thousand/µL    Basophils Absolute 0 01 0 00 - 0 10 Thousands/µL   Comprehensive metabolic panel   Result Value Ref Range    Sodium 136 136 - 145 mmol/L    Potassium 4 4 3 5 - 5 3 mmol/L    Chloride 98 (L) 100 - 108 mmol/L    CO2 32 21 - 32 mmol/L    Anion Gap 6 4 - 13 mmol/L    BUN 23 5 - 25 mg/dL    Creatinine 1 44 (H) 0 60 - 1 30 mg/dL    Glucose 552 (HH) 65 - 140 mg/dL    Calcium 9 5 8 3 - 10 1 mg/dL    AST 10 5 - 45 U/L    ALT 25 12 - 78 U/L    Alkaline Phosphatase 143 (H) 46 - 116 U/L    Total Protein 8 1 6 4 - 8 2 g/dL Albumin 3 6 3 5 - 5 0 g/dL    Total Bilirubin 0 27 0 20 - 1 00 mg/dL    eGFR 46 ml/min/1 73sq m   BNP   Result Value Ref Range    NT-proBNP 787 (H) <125 pg/mL   Acetone   Result Value Ref Range    Acetone, Bld Negative Negative   Magnesium   Result Value Ref Range    Magnesium 2 1 1 6 - 2 6 mg/dL   Phosphorus   Result Value Ref Range    Phosphorus 3 9 2 7 - 4 5 mg/dL   Urine Microscopic   Result Value Ref Range    RBC, UA None Seen None Seen, 0-5 /hpf    WBC, UA None Seen None Seen, 0-5, 5-55, 5-65 /hpf    Epithelial Cells None Seen None Seen, Occasional /hpf    Bacteria, UA Occasional None Seen, Occasional /hpf    Hyaline Casts, UA None Seen None Seen /lpf   Troponin I   Result Value Ref Range    Troponin I 0 49 (H) <=0 04 ng/mL   Hemoglobin A1c (Orders if not completed within the last 90 days)   Result Value Ref Range    Hemoglobin A1C 11 2 (H) 4 2 - 6 3 %     mg/dl   TSH, 3rd generation   Result Value Ref Range    TSH 3RD GENERATON 0 531 0 358 - 3 740 uIU/mL   Protime-INR   Result Value Ref Range    Protime 14 3 12 1 - 14 4 seconds    INR 1 11 0 86 - 1 16   Platelet count   Result Value Ref Range    Platelets 661 180 - 584 Thousands/uL    MPV 11 9 8 9 - 12 7 fL   Troponin I   Result Value Ref Range    Troponin I 0 46 (H) <=0 04 ng/mL   Troponin I   Result Value Ref Range    Troponin I 0 47 (H) <=0 04 ng/mL   Comprehensive metabolic panel   Result Value Ref Range    Sodium 136 136 - 145 mmol/L    Potassium 3 8 3 5 - 5 3 mmol/L    Chloride 100 100 - 108 mmol/L    CO2 27 21 - 32 mmol/L    Anion Gap 9 4 - 13 mmol/L    BUN 23 5 - 25 mg/dL    Creatinine 1 45 (H) 0 60 - 1 30 mg/dL    Glucose 227 (H) 65 - 140 mg/dL    Calcium 9 3 8 3 - 10 1 mg/dL    AST 11 5 - 45 U/L    ALT 18 12 - 78 U/L    Alkaline Phosphatase 109 46 - 116 U/L    Total Protein 6 9 6 4 - 8 2 g/dL    Albumin 3 2 (L) 3 5 - 5 0 g/dL    Total Bilirubin 0 27 0 20 - 1 00 mg/dL    eGFR 45 ml/min/1 73sq m   Magnesium   Result Value Ref Range    Magnesium 2 0 1 6 - 2 6 mg/dL   Phosphorus   Result Value Ref Range    Phosphorus 2 8 2 7 - 4 5 mg/dL   CBC (With Platelets)   Result Value Ref Range    WBC 9 91 4 31 - 10 16 Thousand/uL    RBC 4 03 3 81 - 5 12 Million/uL    Hemoglobin 11 2 (L) 11 5 - 15 4 g/dL    Hematocrit 35 0 34 8 - 46 1 %    MCV 87 82 - 98 fL    MCH 27 8 26 8 - 34 3 pg    MCHC 32 0 31 4 - 37 4 g/dL    RDW 13 8 11 6 - 15 1 %    Platelets 148 950 - 362 Thousands/uL    MPV 11 2 8 9 - 12 7 fL   POCT urinalysis dipstick   Result Value Ref Range    Color, UA Yellow     Clarity, UA Clear     EXT Glucose, UA >500     EXT Bilirubin, UA (Ref: Negative) Neg     EXT Ketones, UA (Ref: Negative) Neg     EXT Spec Grav, UA 1 010     EXT Blood, UA (Ref: Negative) Trace-intact     EXT pH, UA 5 5     EXT Protein, UA (Ref: Negative) Trace     EXT Urobilinogen, UA (Ref: 0 2- 1 0) 0 2     EXT Leukocytes, UA (Ref: Negative) Neg     EXT Nitrite, UA (Ref: Negative) Neg    ECG 12 lead   Result Value Ref Range    Ventricular Rate 107 BPM    Atrial Rate 107 BPM    IN Interval 168 ms    QRSD Interval 94 ms    QT Interval 360 ms    QTC Interval 480 ms    P Eleva 77 degrees    QRS Axis -15 degrees    T Wave Eleva 58 degrees   ECG 12 lead   Result Value Ref Range    Ventricular Rate 98 BPM    Atrial Rate 98 BPM    IN Interval 138 ms    QRSD Interval 94 ms    QT Interval 406 ms    QTC Interval 518 ms    P Axis -20 degrees    QRS Axis 3 degrees    T Wave Axis 66 degrees   ECG 12 lead   Result Value Ref Range    Ventricular Rate 81 BPM    Atrial Rate 81 BPM    IN Interval 118 ms    QRSD Interval 96 ms    QT Interval 442 ms    QTC Interval 513 ms    P Eleva 186 degrees    QRS Axis -5 degrees    T Wave Axis 0 degrees   POCT troponin   Result Value Ref Range    POC Troponin I 0 05 0 00 - 0 08 ng/ml    Specimen Type VENOUS    ED Urine Macroscopic   Result Value Ref Range    Color, UA Yellow     Clarity, UA Clear     pH, UA 5 5 4 5 - 8 0    Leukocytes, UA Negative Negative    Nitrite, UA Negative Negative    Protein, UA Trace (A) Negative mg/dl    Glucose,  (1/2%) (A) Negative mg/dl    Ketones, UA Negative Negative mg/dl    Urobilinogen, UA 0 2 0 2, 1 0 E U /dl E U /dl    Bilirubin, UA Negative Negative    Blood, UA Trace (A) Negative    Specific Gravity, UA 1 010 1 003 - 1 030   POCT Chem 8+   Result Value Ref Range    SODIUM, I-STAT 137 136 - 145 mmol/l    Potassium, i-STAT 4 5 3 5 - 5 3 mmol/L    Chloride, istat 96 (L) 100 - 108 mmol/L    CO2, i-STAT 29 21 - 32 mmol/L    Anion Gap, Istat 18 (H) 4 - 13 mmol/L    Calcium, Ionized i-STAT 1 17 1 12 - 1 32 mmol/L    BUN, I-STAT 23 5 - 25 mg/dl    Creatinine, i-STAT 1 3 0 6 - 1 3 mg/dl    eGFR 52 ml/min/1 73sq m    Glucose, i-STAT 553 (HH) 65 - 140 mg/dl    Hct, i-STAT 39 34 8 - 46 1 %    Hgb, i-STAT 13 3 11 5 - 15 4 g/dl    Specimen Type VENOUS    Fingerstick Glucose (POCT)   Result Value Ref Range    POC Glucose 380 (H) 65 - 140 mg/dl   Fingerstick Glucose (POCT)   Result Value Ref Range    POC Glucose 368 (H) 65 - 140 mg/dl   Fingerstick Glucose (POCT)   Result Value Ref Range    POC Glucose 389 (H) 65 - 140 mg/dl   Fingerstick Glucose (POCT)   Result Value Ref Range    POC Glucose 293 (H) 65 - 140 mg/dl   Fingerstick Glucose (POCT)   Result Value Ref Range    POC Glucose 245 (H) 65 - 140 mg/dl   Fingerstick Glucose (POCT)   Result Value Ref Range    POC Glucose 175 (H) 65 - 140 mg/dl   Fingerstick Glucose (POCT)   Result Value Ref Range    POC Glucose 161 (H) 65 - 140 mg/dl   Fingerstick Glucose (POCT)   Result Value Ref Range    POC Glucose 313 (H) 65 - 140 mg/dl     Imaging Studies:  Per radiology interpretation -    "  Xr Chest 2 Views    Result Date: 12/11/2017  Narrative: CHEST INDICATION:  Chest pain, shortness of breath and vomiting  COMPARISON:  None VIEWS:  Frontal and lateral projections IMAGES:  2 FINDINGS:     The heart is enlarged  The pulmonary vessels appear normal  The lungs and pleural spaces are clear   Visualized osseous structures appear within normal limits for the patient's age  Impression: No acute abnormality in the chest  Workstation performed: [id number]     Ct Head Without Contrast    Result Date: 12/10/2017  Narrative: CT BRAIN - WITHOUT CONTRAST INDICATION:  Headache  Dizziness  Hypertension  COMPARISON:  None  TECHNIQUE:  CT examination of the brain was performed  In addition to axial images, coronal reformatted images were created and submitted for interpretation  Radiation dose length product (DLP) for this visit:  993 mGy-cm   This examination, like all CT scans performed in the [facility], was performed utilizing techniques to minimize radiation dose exposure, including the use of iterative reconstruction and automated exposure control  IMAGE QUALITY:  Diagnostic  FINDINGS:  PARENCHYMA:  Decreased attenuation is noted in the supratentorial white matter demonstrating an appearance most consistent with mild microangiopathic change  No intracranial mass, mass effect or midline shift  No CT signs of acute infarction  There is no parenchymal hemorrhage  VENTRICLES AND EXTRA-AXIAL SPACES:  Normal for patient's age  VISUALIZED ORBITS AND PARANASAL SINUSES:  Unremarkable  CALVARIUM AND EXTRACRANIAL SOFT TISSUES:   Normal      Impression: No acute intracranial abnormality  Workstation performed: [id number]   "    EKG, Pathology, and Other Studies: I have personally reviewed pertinent reports         VTE Prophylaxis: Sequential compression device (Venodyne)     Code Status: Level 1 - Full Code

## 2017-12-11 NOTE — ASSESSMENT & PLAN NOTE
· Unclear systolic or diastolic  · Echo pending  · Received Lasix on admission, DOES NOT appear to be in acute exacerbation    Will not continue Lasix at this time  · Check daily weights  · Monitor volume status

## 2017-12-11 NOTE — CASE MANAGEMENT
Initial Clinical Review    Admission: Date/Time/Statement: 12/10/17 @ 2001     Orders Placed This Encounter   Procedures    Inpatient Admission (expected length of stay for this patient is greater than two midnights)     Standing Status:   Standing     Number of Occurrences:   1     Order Specific Question:   Admitting Physician     Answer:   Walt Baumgarten [[id number]]     Order Specific Question:   Level of Care     Answer:   Level 1 Stepdown [13]     Order Specific Question:   Estimated length of stay     Answer:   More than 2 Midnights     Order Specific Question:   Certification     Answer:   I certify that inpatient services are medically necessary for this patient for a duration of greater than two midnights  See H&P and MD Progress Notes for additional information about the patient's course of treatment  ED: Date/Time/Mode of Arrival:   ED Arrival Information     Expected Arrival Acuity Means of Arrival Escorted By Service Admission Type    - 12/10/2017 16:53 Emergent Ambulance [facility] 994 Emergency    Arrival Complaint    Diabetis          Chief Complaint:   Chief Complaint   Patient presents with    Altered Mental Status     Patient found staring off at slot machine at casino at 1600  Patient found with BS of over 600 by EMS  Patient here from [place] on a bus trip  Patient also has no had her medications since last week due to "grand-daughter did not pick them up "       History of Illness: a 59 y o  female who presents with altered mental status  She was visiting [facility] from [place]  She has for gotten all of her medications  She was found to have altered mental status and staring  In the emergency room she was found to have blood pressure of 225/115, with some heart rate of 103, blood glucose of 553  She was given labetalol x2 IV P, an insulin lispro  Her blood pressure has come down to SBP of 180  She was also noted to have the acute kidney injury    We have given her IV fluids because of hyperglycemia  She will be admitted to step-down level 2    Critical Care has seen patient         ED Vital Signs:   ED Triage Vitals   Temperature Pulse Respirations Blood Pressure SpO2   12/10/17 1726 12/10/17 1726 12/10/17 1726 12/10/17 1726 12/10/17 1726   (!) 97 °F (36 1 °C) 103 21 (!) 225/115 99 %      Temp Source Heart Rate Source Patient Position - Orthostatic VS BP Location FiO2 (%)   12/10/17 1726 12/10/17 1726 12/10/17 1726 12/10/17 1726 --   Tympanic Monitor Sitting Right arm       Pain Score       12/10/17 1831       4        Wt Readings from Last 1 Encounters:   12/10/17 109 kg (240 lb 3 2 oz)       Vital Signs (abnormal):   12/10/17 2313  --  --  --   191/86  --  --  --   12/10/17 2127  --  --  --   205/95  --  --  --   12/10/17 2111  98 6 °F (37 °C)  97  18   211/94  94 %  None (Room air)  --   12/10/17 1904  --  94  20   219/108  90 %  --  Lying   12/10/17 1815  --  104  --   225/132  90 %  --  --   12/10/17 1800  --  --  --  --  --  None (Room air)  --   12/10/17 1726   97 °F (36 1 °C)  103  21   225/115  99 %  None (Room air           Abnormal Labs/Diagnostic Test Results: bnp 787--alk phos 143--bs 552--cr 1 44--cl 98  hga1c 11 2--trop 0 47--0 49  ED Treatment:   Medication Administration from 12/10/2017 1653 to 12/10/2017 2102       Date/Time Order Dose Route Action Action by Comments     12/10/2017 1800 sodium chloride 0 9 % bolus 1,000 mL 0 mL Intravenous Stopped Darlene Perez RN      12/10/2017 1700 sodium chloride 0 9 % bolus 1,000 mL 1,000 mL Intravenous New Bag Darlene Perez RN      12/10/2017 1740 acetaminophen (TYLENOL) tablet 975 mg 975 mg Oral Given Darlene Perez RN      12/10/2017 1807 labetalol (NORMODYNE) injection 20 mg 20 mg Intravenous Given Darlene Perez RN      12/10/2017 1805 ondansetron (ZOFRAN) injection 4 mg 4 mg Intravenous Given Darlene Perez RN      12/10/2017 1925 sodium chloride 0 9 % bolus 1,000 mL 1,000 mL Intravenous New Bag Nav Hale RN      12/10/2017 1932 labetalol (NORMODYNE) injection 20 mg 20 mg Intravenous Given Nav Hale RN      12/10/2017 2020 insulin glargine (LANTUS) subcutaneous injection 10 Units 10 Units Subcutaneous Not Given Nav Hale RN      12/10/2017 2052 hydrALAZINE (APRESOLINE) injection 10 mg 10 mg Intravenous Given Nav Hale RN           Past Medical/Surgical History: Active Ambulatory Problems     Diagnosis Date Noted    No Active Ambulatory Problems     Resolved Ambulatory Problems     Diagnosis Date Noted    No Resolved Ambulatory Problems     Past Medical History:   Diagnosis Date    Cardiac disease     [facility]     COPD [facility]chronic obstructive pulmonary disease) [facility]     [facility]     Hypertension     Seizures ([facility])        Admitting Diagnosis: [facility] mellitus [facility] [E11 9]  Cardiac disease [I51 9]  CHF (congestive heart failure) ([facility]) [I50 9]  Altered mental status [R41 82]  Hyperglycemia [R73 9]  ROSELIA (acute kidney injury) ([facility] ) [N17 9]  Hypertensive urgency [I16 0]  Altered mental status, unspecified [R41 82]    Age/Sex: 59 y o  female    Assessment/Plan: Hypertensive emergency  Type 2 DM with Hyperglycemia   CAD  Altered mental status  COPD  CHF        Plan:  Admit to med/surg  telemetry monitoring  STEP down level 2 as patient has hypertensive urgency and hyperglycemia needing insulin        Hypertensive  emergency: patient is noncompliant and did not take her medications today   She came to visit [facility] without her medications  Receive labetolol x 2 ivp  Will start nitro paste 1 inch  Also hydralazine 10 mg ivp every 6 hrs for SBP>170  Telemetry monitoring  Discussed with icu team     Type 2 DM Uncontrolled blood glucose  Starting on insulin drip  Monitor fingerstick glucose every 2 hrly  Check BMP, mg and phos every 6 hrly     CAD: unsure what patient is on, would start with asprin and atorvastatin     Altered mental status: patient has improved and continue to monitor   Seizure precaution  Keppra 100 mg bid   Dilantin 200 mg at night and 100 mg morning  Monitor dilantin levels  Neurology consult  Pharmacy needs to be called in the am for medications adjustments     CHF: unsure if she has diastolic or systolic CHF  Will get a ECHO in the morning   Start lasix 20 mg now       ROSELIA: unsure of her baseline, would continue with fluids and lasix  BMP in am       Admission Orders:  Scheduled Meds:   cloNIDine 0 2 mg Oral BID   clopidogrel 75 mg Oral Daily   docusate sodium 100 mg Oral BID   enoxaparin 40 mg Subcutaneous Daily   hydrALAZINE 10 mg Intravenous Q6H [facility]   insulin lispro 10 Units Subcutaneous Once   levETIRAcetam 1,000 mg Oral Q12H [facility]   metoprolol succinate 50 mg Oral Daily   pantoprazole 40 mg Intravenous Q24H [facility]   phenytoin 100 mg Oral TID   phenytoin 100 mg Oral HS   senna 1 tablet Oral Daily     Continuous Infusions:   insulin regular (HumuLIN R,NovoLIN R) infusion 0 3-21 Units/hr Last Rate: 4 Units/hr (12/11/17 0610)     PRN Meds:   acetaminophen    acetaminophen    aluminum-magnesium hydroxide-simethicone    calcium carbonate    ondansetron    fs bs check q2  telm  venodynes  ua culture  Diet maribel cont  Consult critical care   Consult neurology  Consult cardiology

## 2017-12-11 NOTE — PROGRESS NOTES
[facility] 59 y o  female MRN: [id number]  Unit/Bed#: [facility] 531-01 Encounter: [id number]    Physician Requesting Consult: Nikos Dang MD    Reason for Consultation / Chief Complaint: HTN    History of Present Illness:  Martyn Duane is a 59 y o  female who presents to ER from the [facility] she usually lives in [place], patient has not been taking her medications since Friday, which includes several hypertensive medications and 2 different seizure medications, patient is also type 2 diabetic who takes metformin should AVR Lantus 50 at night and Humalog for meals  Patient was at the [facility] and had an episode of altered mental status probably possible seizure, with hypertension and hyperglycemia  Treated in the ER with regular insulin 10 units 2 L of fluid and 40 mg of labetalol  Patient denies any pain at this time  History obtained from chart review and the patient  Past Medical History:  Past Medical History:   Diagnosis Date    Cardiac disease     [facility]congest[facility] failure) ([facility] )     COPD (chronic obstructive pulmonary disease) ([facility] )     Diabetes mellitus ([facility] )     Hypertension     Seizures ([facility] )        Past Surgical History:  Past Surgical History:   Procedure Laterality Date    CARDIAC CATHETERIZATION  12/2017    R groin access, no intervention    CHOLECYSTECTOMY         Past Family History:  History reviewed  No pertinent family history  Social History:  History   Smoking Status    Never Smoker   Smokeless Tobacco    Never Used     History   Alcohol Use No     History   Drug Use No     Marital Status: Single  Exercise History: n/a    Home Medications:   Prior to Admission medications    Medication Sig Start Date End Date Taking?  Authorizing Provider   aspirin (ECOTRIN LOW STRENGTH) 81 mg EC tablet Take 81 mg by mouth 2 (two) times a day     Yes Historical Provider, MD   atorvastatin (LIPITOR) 40 mg tablet Take 40 mg by mouth daily Yes Historical Provider, MD   cloNIDine (CATAPRES) 0 2 mg tablet Take 0 2 mg by mouth 2 (two) times a day   Yes Historical Provider, MD   clopidogrel (PLAVIX) 75 mg tablet Take 75 mg by mouth daily   Yes Historical Provider, MD   insulin aspart (NovoLOG) 100 units/mL injection Inject 16 Units under the skin 2 (two) times a day before meals   Yes Historical Provider, MD   insulin glargine (LANTUS) 100 units/mL subcutaneous injection Inject 50 Units under the skin 2 (two) times a day   Yes Historical Provider, MD   LEVETIRACETAM PO Take 100 mg by mouth 2 (two) times a day   Yes Historical Provider, MD   levocetirizine (XYZAL) 5 MG tablet Take 5 mg by mouth every evening   Yes Historical Provider, MD   lisinopril (ZESTRIL) 2 5 mg tablet Take 2 5 mg by mouth daily   Yes Historical Provider, MD   metFORMIN (GLUCOPHAGE) 1000 MG tablet Take 1,000 mg by mouth 2 (two) times a day with meals   Yes Historical Provider, MD   metoprolol succinate (TOPROL-XL) 50 mg 24 hr tablet Take 50 mg by mouth daily   Yes Historical Provider, MD   phenytoin (DILANTIN) 100 mg ER capsule Take 100 mg by mouth 3 (three) times a day 100 mg at 0900 and 2100    Bonny Cunning 200 mg at 1500       Yes Historical Provider, MD   sitaGLIPtin (JANUVIA) 100 mg tablet Take 100 mg by mouth daily   Yes Historical Provider, MD       Inpatient Medications:  Scheduled Meds:  cloNIDine 0 2 mg Oral BID   [START ON 12/11/2017] clopidogrel 75 mg Oral Daily   docusate sodium 100 mg Oral BID   [START ON 12/11/2017] enoxaparin 40 mg Subcutaneous Daily   [START ON 12/11/2017] hydrALAZINE 10 mg Intravenous Q6H [address]   insulin lispro 10 Units Subcutaneous Once   levETIRAcetam 1,000 mg Oral Q12H [address]   [START ON 12/11/2017] metoprolol succinate 50 mg Oral Daily   [START ON 12/11/2017] pantoprazole 40 mg Intravenous Q24H [address]   phenytoin 100 mg Oral TID   phenytoin 100 mg Oral HS   [START ON 12/11/2017] senna 1 tablet Oral Daily     Continuous Infusions:  insulin regular (HumuLIN R,NovoLIN R) infusion 0 3-21 Units/hr Last Rate: 8 Units/hr (12/10/17 2224)     PRN Meds:    acetaminophen 650 mg Q6H PRN   acetaminophen 975 mg Q6H PRN   aluminum-magnesium hydroxide-simethicone 30 mL Q6H PRN   calcium carbonate 1,000 mg TID PRN   ondansetron 4 mg Q6H PRN       Allergies:  No Known Allergies    ROS:   Review of Systems   Constitutional: Negative for appetite change, diaphoresis, fatigue and fever  HENT: Negative for congestion, facial swelling, rhinorrhea, sneezing and tinnitus  Eyes: Negative for photophobia, pain and discharge  Respiratory: Positive for cough  Negative for apnea and shortness of breath  Cardiovascular: Positive for leg swelling  Negative for chest pain  Gastrointestinal: Positive for diarrhea, nausea and vomiting  Negative for abdominal distention and abdominal pain  Endocrine: Negative for cold intolerance, polydipsia and polyphagia  Genitourinary: Negative for enuresis, frequency and hematuria  Musculoskeletal: Negative for arthralgias, gait problem and myalgias  Skin: Negative for color change and rash  Allergic/Immunologic: Negative for environmental allergies and food allergies  Neurological: Positive for seizures  Negative for syncope, speech difficulty, light-headedness and headaches  Hematological: Negative for adenopathy  Does not bruise/bleed easily  Psychiatric/Behavioral: Negative for behavioral problems, decreased concentration and hallucinations  The patient is not hyperactive          Hemodynamic Monitoring:  N/A     Non-Invasive/Invasive Ventilation Settings:  Respiratory    Lab Data (Last 4 hours)    None         O2/Vent Data (Last 4 hours)    None              No results found for: PHART, QEJ4XTW, PO2ART, YNX7RRO, E7RQJCGS, BEART, SOURCE  SpO2: SpO2: 94 %, SpO2 Activity: SpO2 Activity: At Rest, SpO2 Device: O2 Device: None (Room air)    Vitals:  Vitals:    12/10/17 1726 12/10/17 1815 12/10/17 1904 12/10/17 2111   BP: (!) 225/115 (!) 225/132 (!) 219/108 (!) 211/94   Pulse: 103 104 94 97   Resp:  18   Temp: (!) 97 °F (36 1 °C)   98 6 °F (37 °C)   TempSrc: Tympanic   Oral   SpO2: 99% 90% 90% 94%   Weight:    109 kg (240 lb 3 2 oz)   Height:    5' 4" (1 626 m)     Temperature:   Temp (24hrs), Av 8 °F (36 6 °C), Min:97 °F (36 1 °C), Max:98 6 °F (37 °C)    Current Temperature: 98 6 °F (37 °C)  Weights:   IBW: 54 7 kg  Body mass index is 41 23 kg/m²  Physical Exam     General:  Obese 38-year-old female, GCS 15  Head: NC/ AT   HEENT:  PERRLA,   Neck: supple,   Chest:  Decreased to auscultation bilaterally, no rhonchi  No wheezing  No rales,   No tenderness to palpitation, atraumatic no bruising  CV: RRR, no murmurs, S1/ S2 normal, no rubs  Abd: Soft and non tender, non distended, bowel sounds are present, no guarding or rebound, no abdominal bruit  MSK:  Moves all extremities   Pulses: +2 femoral, no bruit, brachial, radial, DP/ PT +2    Neuro: Alert and oriented ×3, Sensation intact           Labs:    Results from last 7 days  Lab Units 12/10/17  2128 12/10/17  1754 12/10/17  1730   WBC Thousand/uL  --   --  8 59   HEMOGLOBIN g/dL  --   --  12 2   I STAT HEMOGLOBIN g/dl  --  13 3  --    HEMATOCRIT %  --   --  39 1   PLATELETS Thousands/uL 253  --  251   NEUTROS PCT %  --   --  60   MONOS PCT %  --   --  5      Results from last 7 days  Lab Units 12/10/17  1754 12/10/17  1730   SODIUM mmol/L  --  136   POTASSIUM mmol/L  --  4 4   CHLORIDE mmol/L  --  98*   CO2 mmol/L  --  32   BUN mg/dL  --  23   CREATININE mg/dL  --  1 44*   CALCIUM mg/dL  --  9 5   TOTAL PROTEIN g/dL  --  8 1   BILIRUBIN TOTAL mg/dL  --  0 27   ALK PHOS U/L  --  143*   ALT U/L  --  25   AST U/L  --  10   GLUCOSE RANDOM mg/dL  --  552*   GLUCOSE, ISTAT mg/dl 553*  --        Results from last 7 days  Lab Units 12/10/17  1730   MAGNESIUM mg/dL 2 1       Results from last 7 days  Lab Units 12/10/17  1730   PHOSPHORUS mg/dL 3 9                0  Lab Value Date/Time   TROPONINI 0 49 (H) 12/10/2017 2128       ABG:        Imaging:   Ct Head Without Contrast    Result Date: 12/10/2017  Narrative: CT BRAIN - WITHOUT CONTRAST INDICATION:  Headache  Dizziness  Hypertension  COMPARISON:  None  TECHNIQUE:  CT examination of the brain was performed  In addition to axial images, coronal reformatted images were created and submitted for interpretation  Radiation dose length product (DLP) for this visit:  993 mGy-cm   This examination, like all CT scans performed in the [facility], was performed utilizing techniques to minimize radiation dose exposure, including the use of iterative reconstruction and automated exposure control  IMAGE QUALITY:  Diagnostic  FINDINGS:  PARENCHYMA:  Decreased attenuation is noted in the supratentorial white matter demonstrating an appearance most consistent with mild microangiopathic change  No intracranial mass, mass effect or midline shift  No CT signs of acute infarction  There is no parenchymal hemorrhage  VENTRICLES AND EXTRA-AXIAL SPACES:  Normal for patient's age  VISUALIZED ORBITS AND PARANASAL SINUSES:  Unremarkable  CALVARIUM AND EXTRACRANIAL SOFT TISSUES:   Normal      Impression: No acute intracranial abnormality  Workstation performed: [id number]      I have personally reviewed pertinent reports  EKG: NSR with left axis This was personally reviewed by myself  Micro:  No results found for: Pito Collier Level    ______________________________________________________________________    Assessment     Principal Problem:    Hypertensive urgency  Active Problems:    Altered mental status    Cardiac disease    COPD (chronic obstructive pulmonary disease) ([facility])    CHF (congestive heart failure) ([facility])    Diabetes mellitus ([facility] )    Hypertension    Seizures ([facility] )  Resolved Problems:    * No resolved hospital problems  *        PLAN    Hypertensive urgency  Hydralazine 10 mg IV q 6 hours p r n    Restart clonidine, metoprolol   Hold lisinopril in until kidney function resolved  If needed start Cardene drip  Altered mental status with seizure history   Patient did not take her medications since Friday  Restart Keppra 1000 mg p o  B i d  Restart Dilantin Dilantin level is pending  Hyperglycemia with  type 2 diabetes  Started on insulin drip  Tomorrow restart home medications  Patient is on metformin, junivea  Lantus and NovoLog 16 units twice a day  If problem controlling consider endocrine consult  CAD  Troponin 0 5 probably related to hypertension and events of today demand ischemia  Continue to trend troponins  Give aspirin,   Cardiology consult  If troponin continues to rise consider heparin ggt                 VTE Pharmacologic Prophylaxis: Sequential compression device (Venodyne)   VTE Mechanical Prophylaxis: sequential compression device    Invasive lines and devices: Invasive Devices          No matching active lines, drains, or airways          Code Status: Level 1 - Full Code  POA:    POLST:      Given critical illness, patient length of stay will require greater than two midnights  Portions of the record may have been created with voice recognition software  Occasional wrong word or "sound a like" substitutions may have occurred due to the inherent limitations of voice recognition software  Read the chart carefully and recognize, using context, where substitutions have occurred        Juan Jose Thayer PA-C    Inpatient consult to Gabriel Harper performed by: Leny Nava ordered by: Vani Koroma

## 2017-12-11 NOTE — SUBJECTIVE & OBJECTIVE
VTE Pharmacologic Prophylaxis:   Pharmacologic: Enoxaparin (Lovenox)  Mechanical VTE Prophylaxis in Place: Yes    Patient Centered Rounds: I have performed bedside rounds with nursing staff today  Discussions with Specialists or Other Care Team Provider:  Dr Marissa Willingham ([facility]), neurology    Education and Discussions with Family / Patient:  Patient and significant other at bedside    Time Spent for Care: 30 minutes  More than 50% of total time spent on counseling and coordination of care as described above  Current Length of Stay: 1 day(s)    Current Patient Status: Inpatient   Certification Statement: The patient will continue to require additional inpatient hospital stay due to Continues on insulin infusion, need to stabilize blood sugars, continue to monitor blood pressure    Discharge Plan:  Home when medically stable and cleared from Cardiology and Neurology standpoint    Code Status: Level 1 - Full Code      Subjective:   Patient drowsy but arouses easily and answers questions appropriately  Reports headache this morning that has improved with Tylenol  Denies chest pain or shortness of breath  No abdominal pain nausea or vomiting  No dizziness  Objective:     Vitals:   Temp (24hrs), Av 5 °F (36 9 °C), Min:97 °F (36 1 °C), Max:99 8 °F (37 7 °C)    HR:  [] 75  Resp:  [15-22] 16  BP: (106-225)/() 116/53  SpO2:  [90 %-99 %] 97 %  Body mass index is 41 23 kg/m²  Input and Output Summary (last 24 hours): Intake/Output Summary (Last 24 hours) at 17 1406  Last data filed at 17 1240   Gross per 24 hour   Intake           2724 3 ml   Output              400 ml   Net           2324 3 ml       Physical Exam:     Physical Exam   Constitutional: She is oriented to person, place, and time  She appears well-developed and well-nourished  She appears lethargic  She is easily aroused  No distress  Appears older than stated age   HENT:   Head: Normocephalic     Eyes: Pupils are equal, round, and reactive to light  Neck: Neck supple  Cardiovascular: Normal rate, regular rhythm and intact distal pulses  No murmur heard  Pulmonary/Chest: Effort normal and breath sounds normal  No respiratory distress  Abdominal: Soft  Bowel sounds are normal  She exhibits no distension  There is no tenderness  obese   Musculoskeletal: Normal range of motion  She exhibits no edema  Neurological: She is oriented to person, place, and time and easily aroused  She appears lethargic  No cranial nerve deficit  Skin: Skin is warm and dry  Psychiatric: She has a normal mood and affect  Her behavior is normal    Vitals reviewed  Additional Data:     Labs:      Results from last 7 days  Lab Units 12/11/17 0437  12/10/17  1730   WBC Thousand/uL 9 91  --  8 59   HEMOGLOBIN g/dL 11 2*  --  12 2   I STAT HEMOGLOBIN   --   < >  --    HEMATOCRIT % 35 0  --  39 1   PLATELETS Thousands/uL 222  < > 251   NEUTROS PCT %  --   --  60   LYMPHS PCT %  --   --  33   MONOS PCT %  --   --  5   EOS PCT %  --   --  2   < > = values in this interval not displayed  Results from last 7 days  Lab Units 12/11/17  0437   SODIUM mmol/L 136   POTASSIUM mmol/L 3 8   CHLORIDE mmol/L 100   CO2 mmol/L 27   BUN mg/dL 23   CREATININE mg/dL 1 45*   CALCIUM mg/dL 9 3   TOTAL PROTEIN g/dL 6 9   BILIRUBIN TOTAL mg/dL 0 27   ALK PHOS U/L 109   ALT U/L 18   AST U/L 11   GLUCOSE RANDOM mg/dL 227*       Results from last 7 days  Lab Units 12/11/17  0455   INR  1 11       * I Have Reviewed All Lab Data Listed Above  * Additional Pertinent Lab Tests Reviewed:  Nikunj 66 Admission Reviewed    Imaging:    Imaging Reports Reviewed Today Include:  Chest x-ray, CT head  Imaging Personally Reviewed by Myself Includes:  None    Recent Cultures (last 7 days):           Last 24 Hours Medication List:     aspirin 81 mg Oral Daily   cloNIDine 0 2 mg Oral BID   clopidogrel 75 mg Oral Daily   docusate sodium 100 mg Oral BID enoxaparin 40 mg Subcutaneous Daily   hydrALAZINE 10 mg Intravenous Q6H [address]   insulin lispro 10 Units Subcutaneous Once   levETIRAcetam 1,000 mg Oral Q12H [facility]   metoprolol succinate 50 mg Oral Daily   pantoprazole 40 mg Intravenous Q24H [facility]   phenytoin 100 mg Oral TID   phenytoin 100 mg Oral HS   senna 1 tablet Oral Daily        Today, Patient Was Seen By: JONO Abad    ** Please Note: Dictation voice to text software may have been used in the creation of this document   **

## 2017-12-11 NOTE — CONSULTS
Consultation - Srikanth Ledesma 59 y o  female MRN: [id number]    Unit/Bed#: [facility] 531-01 Encounter: [id number]      Assessment/Plan     Assessment:  DM 2 with hyperglycemia and long term insulin use and ?noncompliance with meds    Plan:  Patient currently on insulin drip  A1C 11 2 on admission  Sugars fluctuating as she eating well and is not receiving prandial insulin  Will transition her to her home regimen tonight which is Lantus 50 units BID, Novolog 16 ac  Turn insulin gtt off 1 hour after first Lantus injections  Suspect Novolog dose with need to go up  Continue to monitor and make adjustments as needed  Monitor for hypoglycemia  CC: Diabetes Consult    History of Present Illness     HPI: Srikanth Ledesma is a 59y o  year old female with type 2 diabetes for 5 years  She is on insulin at home  She reports polyuria and polydipsia and blurred vision at home  Denies hypoglycemia  Denies known complications of Dm  Admitted for hypertensive emergency to medicine service  Consults    Review of Systems   Constitutional: Negative for appetite change, chills and fever  HENT: Negative for congestion and trouble swallowing  Eyes: Negative for visual disturbance  Respiratory: Negative for shortness of breath  Cardiovascular: Negative for chest pain, palpitations and leg swelling  Gastrointestinal: Negative for abdominal pain, constipation, diarrhea, nausea and vomiting  Endocrine: Positive for polydipsia and polyuria  Genitourinary: Negative for difficulty urinating and frequency  Musculoskeletal: Negative for arthralgias  Skin: Negative for rash  Neurological: Positive for dizziness and light-headedness  Negative for weakness  Psychiatric/Behavioral: Negative for agitation and confusion         Historical Information   Past Medical History:   Diagnosis Date    Cardiac disease     CHF [facility]congestive heart failure) ([facility] )     COPD (chronic obstructive pulmonary disease) ([facility] )  Diabetes mellitus [facility]     Hypertension     Seizures ([facility] )      Past Surgical History:   Procedure Laterality Date    CARDIAC CATHETERIZATION  12/2017    R groin access, no intervention    CHOLECYSTECTOMY       Social History   History   Alcohol Use No     History   Drug Use No     History   Smoking Status    Never Smoker   Smokeless Tobacco    Never Used     Family History: History reviewed  No pertinent family history      Meds/Allergies   Current Facility-Administered Medications   Medication Dose Route Frequency Provider Last Rate Last Dose    acetaminophen (TYLENOL) tablet 650 mg  650 mg Oral Q6H PRN Gael Sheth MD   650 mg at 12/11/17 1049    acetaminophen (TYLENOL) tablet 975 mg  975 mg Oral Q6H PRN Ruddy Public, DO   975 mg at 12/10/17 1740    aluminum-magnesium hydroxide-simethicone (MYLANTA) 200-200-20 mg/5 mL oral suspension 30 mL  30 mL Oral Q6H PRN Gael Sheth MD        aspirin chewable tablet 81 mg  81 mg Oral Daily Kelsy Kelley, CRNP   81 mg at 12/11/17 1211    calcium carbonate (TUMS) chewable tablet 1,000 mg  1,000 mg Oral TID PRN Juan Jose Thayer PA-C   1,000 mg at 12/10/17 2202    cloNIDine (CATAPRES) tablet 0 2 mg  0 2 mg Oral BID Juan Jose Thayer PA-C   0 2 mg at 12/11/17 1071    clopidogrel (PLAVIX) tablet 75 mg  75 mg Oral Daily Juan Jose Thayer PA-C   75 mg at 12/11/17 8330    docusate sodium (COLACE) capsule 100 mg  100 mg Oral BID Gael Sheth MD   100 mg at 12/11/17 0903    enoxaparin (LOVENOX) subcutaneous injection 40 mg  40 mg Subcutaneous Daily Gael Sheth MD   40 mg at 12/11/17 0904    hydrALAZINE (APRESOLINE) injection 10 mg  10 mg Intravenous Q6H [address] Lore Silva PA-C        insulin lispro (HumaLOG) 100 units/mL subcutaneous injection 10 Units  10 Units Subcutaneous Once Ruddy Public, DO        insulin regular (HumuLIN R,NovoLIN R) 1 Units/mL in sodium chloride 0 9 % 100 mL infusion  0 3-21 Units/hr Intravenous Titrated Juan Jose Thayer PA-C 11 mL/hr at 12/11/17 1010 11 Units/hr at 12/11/17 1010    levETIRAcetam (KEPPRA) tablet 1,000 mg  1,000 mg Oral Q12H [address] Juan Jose Thayer PA-C   1,000 mg at 12/11/17 7715    metoprolol succinate (TOPROL-XL) 24 hr tablet 50 mg  50 mg Oral Daily Juan Jose Thayer PA-C   50 mg at 12/11/17 0903    ondansetron (ZOFRAN) injection 4 mg  4 mg Intravenous Q6H PRN Kelly Rivers MD        pantoprazole (PROTONIX) injection 40 mg  40 mg Intravenous Q24H [address] Juan Jose Thayer PA-C   40 mg at 12/11/17 0904    phenytoin (DILANTIN) ER capsule 100 mg  100 mg Oral TID Juan Jose Thayer PA-C   100 mg at 12/11/17 0905    phenytoin (DILANTIN) ER capsule 100 mg  100 mg Oral HS Juan Jose Thayer PA-C   100 mg at 12/10/17 2228    senna (SENOKOT) tablet 8 6 mg  1 tablet Oral Daily Kelly Rivers MD   8 6 mg at 12/11/17 0903     No Known Allergies    Objective   Vitals: Blood pressure 116/53, pulse 75, temperature 98 3 °F (36 8 °C), temperature source Oral, resp  rate 16, height 5' 4" (1 626 m), weight 109 kg (240 lb 3 2 oz), SpO2 97 %  Intake/Output Summary (Last 24 hours) at 12/11/17 1508  Last data filed at 12/11/17 1240   Gross per 24 hour   Intake           2724 3 ml   Output              400 ml   Net           2324 3 ml     Invasive Devices     Peripheral Intravenous Line            Peripheral IV 12/10/17 Right Antecubital 1 day                Physical Exam   Constitutional: She is oriented to person, place, and time  She appears well-developed and well-nourished  No distress  HENT:   Head: Normocephalic and atraumatic  Mouth/Throat: No oropharyngeal exudate  Eyes: Conjunctivae and EOM are normal  Pupils are equal, round, and reactive to light  No scleral icterus  Neck: Normal range of motion  Neck supple  No thyromegaly present  Cardiovascular: Normal rate and regular rhythm  Pulmonary/Chest: Effort normal and breath sounds normal  No respiratory distress   She has no wheezes  Abdominal: Soft  Bowel sounds are normal  She exhibits no distension  There is no tenderness  Musculoskeletal: She exhibits no edema  Lymphadenopathy:     She has no cervical adenopathy  Neurological: She is alert and oriented to person, place, and time  Skin: Skin is warm and dry  No rash noted  She is not diaphoretic  Psychiatric: She has a normal mood and affect  Her behavior is normal        The history was obtained from the review of the chart, patient  Lab Results:     Results from last 7 days  Lab Units 12/10/17  2128   HEMOGLOBIN A1C % 11 2*     Lab Results   Component Value Date    WBC 9 91 12/11/2017    HGB 11 2 (L) 12/11/2017    HCT 35 0 12/11/2017    MCV 87 12/11/2017     12/11/2017     Lab Results   Component Value Date/Time    BUN 23 12/11/2017 04:37 AM     12/11/2017 04:37 AM    K 3 8 12/11/2017 04:37 AM     12/11/2017 04:37 AM    CO2 27 12/11/2017 04:37 AM    CREATININE 1 45 (H) 12/11/2017 04:37 AM    AST 11 12/11/2017 04:37 AM    ALT 18 12/11/2017 04:37 AM     No results for input(s): CHOL, HDL, LDL, TRIG, VLDL in the last 72 hours  No results found for: Mohit Jimenez  POC Glucose (mg/dl)   Date Value   12/11/2017 236 (H)   12/11/2017 313 (H)   12/11/2017 161 (H)   12/11/2017 175 (H)   12/11/2017 245 (H)   12/11/2017 293 (H)   12/11/2017 389 (H)   12/10/2017 368 (H)   12/10/2017 380 (H)       Imaging Studies: I have personally reviewed pertinent reports  XR chest 2 views [[id number]] Collected: 12/11/17 0050   Order Status: Completed Updated: 12/11/17 0837   Narrative:     CHEST     INDICATION:  Chest pain, shortness of breath and vomiting  COMPARISON:  None    VIEWS:  Frontal and lateral projections    IMAGES:  2    FINDINGS:         The heart is enlarged   The pulmonary vessels appear normal     The lungs and pleural spaces are clear  Visualized osseous structures appear within normal limits for the patient's age     Impression:   No acute abnormality in the chest

## 2017-12-11 NOTE — ASSESSMENT & PLAN NOTE
· Appreciate Neurology consultation  · Patient with toxic metabolic encephalopathy suspect secondary to hypertensive urgency although given reported noncompliance with medications unclear if she had a seizure  · Continue Keppra and Dilantin  · Seizure precautions  · Neurology recommending MRI of the brain without contrast to evaluate for PRES and any acute changes

## 2017-12-11 NOTE — ASSESSMENT & PLAN NOTE
· Uncontrolled as evidenced by hyperglycemia on admission and A1c of 11 2%  · Secondary to noncompliance of medications  · Blood sugars with improved control on insulin infusion  · Will initiate Lantus  ·

## 2017-12-12 ENCOUNTER — APPOINTMENT (INPATIENT)
Dept: NON INVASIVE DIAGNOSTICS | Facility: HOSPITAL | Age: 57
DRG: 064 | End: 2017-12-12
Payer: MEDICARE

## 2017-12-12 ENCOUNTER — APPOINTMENT (INPATIENT)
Dept: NEUROLOGY | Facility: AMBULATORY SURGERY CENTER | Age: 57
DRG: 064 | End: 2017-12-12
Payer: MEDICARE

## 2017-12-12 ENCOUNTER — GENERIC CONVERSION - ENCOUNTER (OUTPATIENT)
Dept: OTHER | Facility: OTHER | Age: 57
End: 2017-12-12

## 2017-12-12 LAB
ANION GAP SERPL CALCULATED.3IONS-SCNC: 7 MMOL/L (ref 4–13)
BUN SERPL-MCNC: 23 MG/DL (ref 5–25)
CALCIUM SERPL-MCNC: 9 MG/DL (ref 8.3–10.1)
CHLORIDE SERPL-SCNC: 102 MMOL/L (ref 100–108)
CO2 SERPL-SCNC: 27 MMOL/L (ref 21–32)
CORTIS SERPL-MCNC: 9.4 UG/DL
CREAT SERPL-MCNC: 1.23 MG/DL (ref 0.6–1.3)
GFR SERPL CREATININE-BSD FRML MDRD: 55 ML/MIN/1.73SQ M
GLUCOSE SERPL-MCNC: 100 MG/DL (ref 65–140)
GLUCOSE SERPL-MCNC: 107 MG/DL (ref 65–140)
GLUCOSE SERPL-MCNC: 225 MG/DL (ref 65–140)
GLUCOSE SERPL-MCNC: 280 MG/DL (ref 65–140)
GLUCOSE SERPL-MCNC: 287 MG/DL (ref 65–140)
GLUCOSE SERPL-MCNC: 294 MG/DL (ref 65–140)
GLUCOSE SERPL-MCNC: 330 MG/DL (ref 65–140)
PA ADP BLD-ACNC: 340 PRU (ref 194–418)
PA ADP BLD-ACNC: 539 ARU
PHENYTOIN SERPL-MCNC: 1.7 UG/ML (ref 10–20)
POTASSIUM SERPL-SCNC: 3.8 MMOL/L (ref 3.5–5.3)
SODIUM SERPL-SCNC: 136 MMOL/L (ref 136–145)

## 2017-12-12 PROCEDURE — 83835 ASSAY OF METANEPHRINES: CPT | Performed by: NURSE PRACTITIONER

## 2017-12-12 PROCEDURE — 80048 BASIC METABOLIC PNL TOTAL CA: CPT | Performed by: INTERNAL MEDICINE

## 2017-12-12 PROCEDURE — 97163 PT EVAL HIGH COMPLEX 45 MIN: CPT

## 2017-12-12 PROCEDURE — 93880 EXTRACRANIAL BILAT STUDY: CPT

## 2017-12-12 PROCEDURE — 80185 ASSAY OF PHENYTOIN TOTAL: CPT | Performed by: PHYSICIAN ASSISTANT

## 2017-12-12 PROCEDURE — 95816 EEG AWAKE AND DROWSY: CPT

## 2017-12-12 PROCEDURE — 82533 TOTAL CORTISOL: CPT | Performed by: NURSE PRACTITIONER

## 2017-12-12 PROCEDURE — G8979 MOBILITY GOAL STATUS: HCPCS

## 2017-12-12 PROCEDURE — 85576 BLOOD PLATELET AGGREGATION: CPT | Performed by: PHYSICIAN ASSISTANT

## 2017-12-12 PROCEDURE — 82948 REAGENT STRIP/BLOOD GLUCOSE: CPT

## 2017-12-12 PROCEDURE — G8978 MOBILITY CURRENT STATUS: HCPCS

## 2017-12-12 PROCEDURE — C9113 INJ PANTOPRAZOLE SODIUM, VIA: HCPCS | Performed by: PHYSICIAN ASSISTANT

## 2017-12-12 RX ORDER — AMLODIPINE BESYLATE 5 MG/1
5 TABLET ORAL EVERY 12 HOURS
Status: DISCONTINUED | OUTPATIENT
Start: 2017-12-12 | End: 2017-12-15 | Stop reason: HOSPADM

## 2017-12-12 RX ORDER — ATORVASTATIN CALCIUM 40 MG/1
40 TABLET, FILM COATED ORAL
Status: DISCONTINUED | OUTPATIENT
Start: 2017-12-12 | End: 2017-12-15 | Stop reason: HOSPADM

## 2017-12-12 RX ORDER — INSULIN GLARGINE 100 [IU]/ML
55 INJECTION, SOLUTION SUBCUTANEOUS EVERY 12 HOURS SCHEDULED
Status: DISCONTINUED | OUTPATIENT
Start: 2017-12-12 | End: 2017-12-15

## 2017-12-12 RX ADMIN — INSULIN GLARGINE 50 UNITS: 100 INJECTION, SOLUTION SUBCUTANEOUS at 08:28

## 2017-12-12 RX ADMIN — AMLODIPINE BESYLATE 5 MG: 5 TABLET ORAL at 11:32

## 2017-12-12 RX ADMIN — ACETAMINOPHEN 975 MG: 325 TABLET, FILM COATED ORAL at 15:50

## 2017-12-12 RX ADMIN — INSULIN LISPRO 2 UNITS: 100 INJECTION, SOLUTION INTRAVENOUS; SUBCUTANEOUS at 21:17

## 2017-12-12 RX ADMIN — SODIUM CHLORIDE 500 ML: 0.9 INJECTION, SOLUTION INTRAVENOUS at 00:00

## 2017-12-12 RX ADMIN — INSULIN LISPRO 16 UNITS: 100 INJECTION, SOLUTION INTRAVENOUS; SUBCUTANEOUS at 11:33

## 2017-12-12 RX ADMIN — DOCUSATE SODIUM 100 MG: 100 CAPSULE, LIQUID FILLED ORAL at 08:29

## 2017-12-12 RX ADMIN — SENNOSIDES 8.6 MG: 8.6 TABLET, FILM COATED ORAL at 08:29

## 2017-12-12 RX ADMIN — ENOXAPARIN SODIUM 40 MG: 40 INJECTION SUBCUTANEOUS at 08:35

## 2017-12-12 RX ADMIN — PHENYTOIN SODIUM 100 MG: 100 CAPSULE ORAL at 21:14

## 2017-12-12 RX ADMIN — AMLODIPINE BESYLATE 5 MG: 5 TABLET ORAL at 21:14

## 2017-12-12 RX ADMIN — INSULIN LISPRO 12 UNITS: 100 INJECTION, SOLUTION INTRAVENOUS; SUBCUTANEOUS at 08:31

## 2017-12-12 RX ADMIN — CLONIDINE HYDROCHLORIDE 0.2 MG: 0.2 TABLET ORAL at 18:22

## 2017-12-12 RX ADMIN — INSULIN LISPRO 16 UNITS: 100 INJECTION, SOLUTION INTRAVENOUS; SUBCUTANEOUS at 08:34

## 2017-12-12 RX ADMIN — LEVETIRACETAM 1000 MG: 500 TABLET ORAL at 08:29

## 2017-12-12 RX ADMIN — HYDRALAZINE HYDROCHLORIDE 10 MG: 20 INJECTION INTRAMUSCULAR; INTRAVENOUS at 11:32

## 2017-12-12 RX ADMIN — PHENYTOIN SODIUM 100 MG: 100 CAPSULE ORAL at 08:32

## 2017-12-12 RX ADMIN — PHENYTOIN SODIUM 100 MG: 100 CAPSULE ORAL at 15:50

## 2017-12-12 RX ADMIN — METOPROLOL SUCCINATE 50 MG: 50 TABLET, EXTENDED RELEASE ORAL at 08:29

## 2017-12-12 RX ADMIN — INSULIN GLARGINE 55 UNITS: 100 INJECTION, SOLUTION SUBCUTANEOUS at 21:14

## 2017-12-12 RX ADMIN — ASPIRIN 81 MG 81 MG: 81 TABLET ORAL at 08:29

## 2017-12-12 RX ADMIN — INSULIN LISPRO 10 UNITS: 100 INJECTION, SOLUTION INTRAVENOUS; SUBCUTANEOUS at 11:33

## 2017-12-12 RX ADMIN — HYDRALAZINE HYDROCHLORIDE 10 MG: 20 INJECTION INTRAMUSCULAR; INTRAVENOUS at 05:13

## 2017-12-12 RX ADMIN — ATORVASTATIN CALCIUM 40 MG: 40 TABLET, FILM COATED ORAL at 18:21

## 2017-12-12 RX ADMIN — LEVETIRACETAM 1000 MG: 500 TABLET ORAL at 21:14

## 2017-12-12 RX ADMIN — DOCUSATE SODIUM 100 MG: 100 CAPSULE, LIQUID FILLED ORAL at 18:22

## 2017-12-12 RX ADMIN — PANTOPRAZOLE SODIUM 40 MG: 40 INJECTION, POWDER, FOR SOLUTION INTRAVENOUS at 08:29

## 2017-12-12 RX ADMIN — HYDRALAZINE HYDROCHLORIDE 10 MG: 20 INJECTION INTRAMUSCULAR; INTRAVENOUS at 18:21

## 2017-12-12 RX ADMIN — CLOPIDOGREL BISULFATE 75 MG: 75 TABLET ORAL at 08:29

## 2017-12-12 RX ADMIN — CLONIDINE HYDROCHLORIDE 0.2 MG: 0.2 TABLET ORAL at 08:29

## 2017-12-12 NOTE — PROGRESS NOTES
Rounded with Endocrinology  Patient status and plan discussed  The meal time insulin dose will be adjusted  Continue to monitor

## 2017-12-12 NOTE — SOCIAL WORK
CM met with the Pt to discuss PT's reported of her being able to benefit going into an acute rehab program which the Pt reported being agreeable to  CM discuss [facility] and Pt gave her permission for a referral to be made to the rehab  CM has made the requested rehab referral, and will continue to follow

## 2017-12-12 NOTE — MALNUTRITION/BMI
This medical record reflects one or more clinical indicators suggestive of malnutrition and/or morbid obesity  Please indicate the one diagnosis below which you feel best reflects the clinical picture  Morbid Obesity    BMI Findings:  40-44 9    Body mass index is 41 23 kg/m²  See Nutrition note dated 12/12/17 for additional details  Completed nutrition assessment is viewable in the nutrition documentation

## 2017-12-12 NOTE — PHYSICAL THERAPY NOTE
Physical Therapy Evaluation    Patient's Name: Vicky Sharma    Admitting Diagnosis  Diabetes mellitus ([facility] ) [E11 9]  Cardiac disease [I51 9]  CHF (congestive heart failure) ([facility] ) [I50 9]  Altered mental status [R41 82]  Hyperglycemia [R73 9]  ROSELIA (acute kidney injury) ([facility] ) [N17 9]  Hypertensive urgency [I16 0]  Altered mental status, unspecified [R41 82]    Problem List  Patient Active Problem List   Diagnosis    Toxic metabolic encephalopathy    Cardiac disease    COPD [facility]chronic obstructive pulmonary disease) ([facility]    CHF (congestive heart failure) ([facility]    Diabetes mellitus ([facility]    Hypertension    Seizures ([facility] )    Hypertensive urgency    Acute kidney injury ([facility] )       Past Medical History  Past Medical History:   Diagnosis Date    Cardiac disease     CHF (congestive heart failure) ([facility]     COPD (chronic obstructive pulmonary disease) ([facility]     Diabetes mellitus ([facility]     Hypertension     Seizures ([facility] )        Past Surgical History  Past Surgical History:   Procedure Laterality Date    CARDIAC CATHETERIZATION  12/2017    R groin access, no intervention    CHOLECYSTECTOMY              12/12/17 8112   Note Type   Note type Eval only   Pain Assessment   Pain Assessment No/denies pain   Home Living   Type of Home Apartment  (13th floor w/ elevator)   Home Layout One level;Elevator   Prior Function   Level of [place] Independent with ADLs and functional mobility  (amb w/o AD)   Lives With Significant other   Receives Help From Family;Friend(s)   Restrictions/Precautions   Braces or Orthoses (denies)   Other Precautions Fall Risk;Telemetry  (bed alarm activated at the end of session)   General   Additional Pertinent History cleared for assessment (spoke to nsg)   Cognition   Overall Cognitive Status [facility]   Arousal/Participation Alert   Orientation Level Oriented to person;Oriented to place;Oriented to situation   Memory Within functional limits   Following Commands Follows one step commands without difficulty   Comments Pt is observed in bed; reports she is trying to rest; agreeable to demonstrate mobility skills and return back to bed   RUE Assessment   RUE Assessment WFL  (AROM)   RUE Strength   RUE Overall Strength Within Functional Limits - able to perform ADL tasks with strength   LUE Assessment   LUE Assessment WFL  (AROM)   LUE Strength   LUE Overall Strength Within Functional Limits - able to perform ADL tasks with strength   RLE Assessment   RLE Assessment WFL  (AROM)   Strength RLE   RLE Overall Strength (fair at the hip; good - knee and ankle (grossly))   LLE Assessment   LLE Assessment WFL  (AROM)   Strength LLE   LLE Overall Strength (fair at the hip; fair + knee and ankle (grossly))   Bed Mobility   Supine to Sit 3  Moderate assistance   Additional items Assist x 1;HOB elevated; Increased time required;Verbal cues;LE management   Sit to Supine 3  Moderate assistance   Additional items Assist x 1;Verbal cues;LE management; Increased time required   Transfers   Sit to Stand 4  Minimal assistance   Additional items Assist x 1;Verbal cues   Stand to Sit 4  Minimal assistance   Additional items Assist x 1;Verbal cues   Additional Comments SCDs back on and activated; pillows placed for (L) UE and (B) LE support; call bell w/in reach   Ambulation/Elevation   Gait pattern Excessively slow; Short stride; Inconsistent bebeto   Gait Assistance 4  Minimal assist   Additional items Assist x 1;Verbal cues   Assistive Device Rolling walker   Distance 15 ft total distance; further amb was not performed due to persistent dizziness; symptoms seemed to subside when back in bed   Balance   Static Sitting Fair   Static Standing Fair -   Ambulatory Poor +   Activity Tolerance   Activity Tolerance Patient limited by fatigue;Treatment limited secondary to medical complications (Comment)  (dizziness; O2 sat = 97 % post session)   Nurse Made Aware spoke to Moshe De La Torre RN and FREDERIC IBARRA     Assessment   Prognosis Good   Problem List Decreased strength;Decreased endurance; Impaired balance;Decreased mobility;Obesity   Assessment Pt is 59 y o  admitted with hx of altered mental status and initial and Dx of Hypertensive urgency; undergoing w/u incl neuro following  Pt 's comorbidities affecting POC include: DM, CAD, COPD, CHF, HTN, seizures and personal factors of: limited support at home  Pt's clinical presentation is currently unstable/unpredictable which is evident in ongoing telem monitoring, additional testing pending, bouts of dizziness w/ mobilization and fall risk  Pt presents w/ generalized weakness, incl min decreased LE strength, decreased functional endurance, decreased activity tolerance, decreased ambulation tolerance, impaired balance, gait deviations and fall risk  Will cont to follow pt in PT for progressive mobilization to address above functional deficits and to max level of (I), endurance, and safety  Currently recommend rehab upon D/C  Will cont to follow until then  Barriers to Discharge Decreased caregiver support   Goals   Patient Goals none expressed   STG Expiration Date 12/22/17   Short Term Goal #1 7-10 days  Pt will amb 150 ft w/ rw, mod (I)  Pt will achieve (I) level w/ bed mob  Pt will perform transfers w/ mod (I)  Plan   Treatment/Interventions Functional transfer training;LE strengthening/ROM; Therapeutic exercise; Endurance training;Bed mobility;Gait training;Spoke to nursing;Spoke to case management   PT Frequency 5x/wk   Recommendation   Recommendation Post acute IP rehab   Equipment Recommended Walker  (w/ (A))   Modified Ashley Scale   Modified Ashley Scale 4   Barthel Index   Feeding 10   Bathing 0   Grooming Score 5   Dressing Score 5   Bladder Score 10   Bowels Score 10   Toilet Use Score 5   Transfers (Bed/Chair) Score 10   Mobility (Level Surface) Score 0   Stairs Score 0   Barthel Index Score 55     Corey Hurst, PT

## 2017-12-12 NOTE — PROGRESS NOTES
Seen pt BP controlled with PO medications, DM managed by primary service and endocrine, neurology workup     No medical critical care needs signed off

## 2017-12-12 NOTE — PROGRESS NOTES
I was asked by nurse to come and complete an NIH stroke scale  The patient received a NIH of a 3 for both legs drifting and decreased sensation on the L side

## 2017-12-12 NOTE — PLAN OF CARE
Problem: PHYSICAL THERAPY ADULT  Goal: Performs mobility at highest level of function for planned discharge setting  See evaluation for individualized goals  Treatment/Interventions: Functional transfer training, LE strengthening/ROM, Therapeutic exercise, Endurance training, Bed mobility, Gait training, Spoke to nursing, Spoke to case management  Equipment Recommended: [place] Boot (w/ (A))       See flowsheet documentation for full assessment, interventions and recommendations  Prognosis: Good  Problem List: Decreased strength, Decreased endurance, Impaired balance, Decreased mobility, Obesity  Assessment: Pt is 59 y o  admitted with hx of altered mental status and initial and Dx of Hypertensive urgency; undergoing w/u incl neuro following  Pt 's comorbidities affecting POC include: DM, CAD, COPD, CHF, HTN, seizures and personal factors of: limited support at home  Pt's clinical presentation is currently unstable/unpredictable which is evident in ongoing telem monitoring, additional testing pending, bouts of dizziness w/ mobilization and fall risk  Pt presents w/ generalized weakness, incl min decreased LE strength, decreased functional endurance, decreased activity tolerance, decreased ambulation tolerance, impaired balance, gait deviations and fall risk  Will cont to follow pt in PT for progressive mobilization to address above functional deficits and to max level of (I), endurance, and safety  Currently recommend rehab upon D/C  Will cont to follow until then  Barriers to Discharge: Decreased caregiver support     Recommendation: Post acute IP rehab          See flowsheet documentation for full assessment

## 2017-12-12 NOTE — PROGRESS NOTES
Progress Note - Neurology   Prieto Mark 59 y o  female MRN: [id number]  Unit/Bed#: [facility] 531-01 Encounter: [id number]    Assessment:  26-year-old female with past medical and neurological history to include stroke and history of seizure  Seizure hx to be staring into the distance loss of awareness eyes rolled back the head shaking and tremor in the mouth and shaking of the upper and lower extremities  She has been maintained on Keppra and Dilantin, she is non compliant with medications  Dilantin level subtherapeutic  She has had since he has seen a was found to be staring into the distance and unresponsive she was brought to [facility] where she was found to be severely hypertensive elevated blood sugars, she has been noncompliant with blood pressure and blood sugar medications  This appears to be a toxic metabolic encephalopathy secondary to increased blood pressures, ROSELIA, and elevated BS  She may have suffered a provoked seizure secondary to above and medical noncompliance  In addition the MRI of the brain revealed acute lacunar infarct in the right parietal corona radiata, focal and micro hemorrhage in the basal ganglia  Plan:  Acute ischemic stroke pathway, this is to include NIH, stroke education  I would also check a a API and P2Y12, Continue antiplatelet medications  Lipid panel, hemoglobin A1c - 11 2  CTA of head and neck with and with out contrast, will hold at this time, with this appearing small vessel and with her renal function, carotid doppler ordered  Reviewed Echo (see report)  Telemetry  Secondary risk factor modification - blood pressure, BS control - lipitor reviewed with medicine team  Continue Dilantin and Keppra - reviewed dosing with attending continue at current dosing regimen, will review if need to loading dose of this medication, since on Keppra as well  Neurological checks    Subjective:   No events overnight per nursing, having EEG completed today    She denies any new complaints  She denies headache, chest pain, shortness of breath, palpitations, nausea or vomiting, or one-sided weakness  I did review with her MRI of the brain findings, and possible etiologies for stroke  ROS:  As per HPI    Vitals: Blood pressure 148/68, pulse 73, temperature 98 5 °F (36 9 °C), temperature source Oral, resp  rate 18, height 5' 4" (1 626 m), weight 109 kg (240 lb 3 2 oz), SpO2 95 %  ,Body mass index is 41 23 kg/m²  amLODIPine 5 mg Oral Q12H   aspirin 81 mg Oral Daily   cloNIDine 0 2 mg Oral BID   clopidogrel 75 mg Oral Daily   docusate sodium 100 mg Oral BID   enoxaparin 40 mg Subcutaneous Daily   hydrALAZINE 10 mg Intravenous Q6H [address]   insulin glargine 50 Units Subcutaneous Q12H [address]   insulin lispro 1-6 Units Subcutaneous HS   insulin lispro 10 Units Subcutaneous Once   insulin lispro 16 Units Subcutaneous TID With Meals   insulin lispro 4-20 Units Subcutaneous TID AC   levETIRAcetam 1,000 mg Oral Q12H NISH   metoprolol succinate 50 mg Oral Daily   pantoprazole 40 mg Intravenous Q24H NISH   phenytoin 100 mg Oral TID   phenytoin 100 mg Oral HS   senna 1 tablet Oral Daily     Physical Exam:   Constitutional - in NAD  HEENT - NC/AT, no icterus noted, conjuctiva clear, oral mucosa free of exudate  Cardiac - No murmur noted, rate regular  Lungs - Clear to auscultation bilaterally, no wheezing noted  Abdomen - Soft and non tender  Extremities - No edema noted    Neurological    Mental status - the patient is awake alert oriented x 3, with no evidence of aphasia or dysarthria, patient is able to follow simple commands  No paraphasic errors noted  She is able tell me President of [place], she is more awake today with less evidence of cognitive slowing noted       Cranial nerves 2 through 12 are intact, except decreased VA     Motor - 5/5 upper extremities and lower extremities, without drift, normal tone and bulk, hip flexor weakness noted in bilateral lowers    No tremor noted    Rapid movements are equal bilaterally    Sensation - nonfocal to touch    Coordination -  no ataxia noted on finger-to-nose     Reflexes are equal    Toes - Left plantar was upgoing    No evidence of clonus or seizure activity noted  Lab, Imaging and other studies:     Recent Results (from the past 24 hour(s))   ECG 12 lead    Collection Time: 12/11/17 10:45 AM   Result Value Ref Range    Ventricular Rate 81 BPM    Atrial Rate 81 BPM    MA Interval 118 ms    QRSD Interval 96 ms    QT Interval 442 ms    QTC Interval 513 ms    P Grady 186 degrees    QRS Axis -5 degrees    T Wave Axis 0 degrees   Fingerstick Glucose (POCT)    Collection Time: 12/11/17 12:00 PM   Result Value Ref Range    POC Glucose 236 (H) 65 - 140 mg/dl   Fingerstick Glucose (POCT)    Collection Time: 12/11/17  3:51 PM   Result Value Ref Range    POC Glucose 167 (H) 65 - 140 mg/dl   Fingerstick Glucose (POCT)    Collection Time: 12/11/17  6:02 PM   Result Value Ref Range    POC Glucose 374 (H) 65 - 140 mg/dl   Fingerstick Glucose (POCT)    Collection Time: 12/11/17  7:58 PM   Result Value Ref Range    POC Glucose 158 (H) 65 - 140 mg/dl   Fingerstick Glucose (POCT)    Collection Time: 12/11/17  9:58 PM   Result Value Ref Range    POC Glucose 120 65 - 140 mg/dl   Fingerstick Glucose (POCT)    Collection Time: 12/11/17 11:50 PM   Result Value Ref Range    POC Glucose 490 (H) 65 - 140 mg/dl   Fingerstick Glucose (POCT)    Collection Time: 12/12/17  2:14 AM   Result Value Ref Range    POC Glucose 280 (H) 65 - 140 mg/dl   Cortisol    Collection Time: 12/12/17  5:28 AM   Result Value Ref Range    Cortisol, Random 9 4 ug/dL   Phenytoin level, total    Collection Time: 12/12/17  5:28 AM   Result Value Ref Range    Phenytoin Lvl 1 7 (L) 10 0 - 20 0 ug/mL   Fingerstick Glucose (POCT)    Collection Time: 12/12/17  6:17 AM   Result Value Ref Range    POC Glucose 287 (H) 65 - 140 mg/dl     DVT on lovenox

## 2017-12-12 NOTE — PROGRESS NOTES
Rounded with Neuro PAMarcelo  Patient status, medications, and plan discussed  NIH Stroke scale ordered  [address] [facility] charge, was busy and said to call Rehab  Spoke to [place] and was told to contact Karol Lee in [place]  Karol Lee will find out who to contact and call me back  1350: Karol Lee informed me to call  charge or [facility] charge  [facility] charge nurse, Nena paiz at 1400

## 2017-12-12 NOTE — PROGRESS NOTES
Cardiology Progress Note - Denice Forbes 59 y o  female MRN: [id number]    Unit/Bed#: [facility] 531-01 Encounter: [id number]      Assessment:  Principal Problem:    Hypertensive urgency  Active Problems:    Toxic metabolic encephalopathy    Cardiac disease    COPD (chronic obstructive pulmonary disease) (HCC)    CHF (congestive heart failure) ([facility])    Diabetes mellitus (HCC)    Seizures ([facility] [address] )    Acute kidney injury ([facility] [address] )      Plan:  Patient is comfortable this morning  She has no chest pain or significant dyspnea  She is in sinus rhythm on telemetry  She apparently previously had a headache which is now resolved  The MRI of the head results are noted in neurology is following  Her blood pressure is elevated  I will add amlodipine to her clonidine and metoprolol  Her echocardiogram demonstrated preserved LV systolic function with severe concentric left ventricular hypertrophy and associated diastolic dysfunction  She has no significant valvular heart disease  Subjective:   Patient seen and examined  No significant events overnight   negative  Objective:     Vitals: Blood pressure 162/79, pulse 78, temperature 98 7 °F (37 1 °C), temperature source Oral, resp  rate 18, height 5' 4" (1 626 m), weight 109 kg (240 lb 3 2 oz), SpO2 97 %  , Body mass index is 41 23 kg/m² , Orthostatic Blood Pressures    Flowsheet Row Most Recent Value   Blood Pressure  162/79 filed at 12/12/2017 5289   Patient Position - Orthostatic VS  Lying filed at 12/12/2017 0706      ,      Intake/Output Summary (Last 24 hours) at 12/12/17 0914  Last data filed at 12/12/17 0535   Gross per 24 hour   Intake              671 ml   Output              400 ml   Net              271 ml       No significant arrhythmias seen on telemetry review         Physical Exam:    GEN: Denice Forbes appears well, alert and oriented x 3, pleasant and cooperative   NECK: supple, no carotid bruits, no JVD or HJR  HEART: normal rate, regular rhythm, normal S1 and S2, no murmurs, clicks, gallops or rubs   LUNGS: clear to auscultation bilaterally; no wheezes, rales, or rhonchi   ABDOMEN: normal bowel sounds, soft, no tenderness, no distention  EXTREMITIES: peripheral pulses normal; no clubbing, cyanosis, or edema  SKIN: warm and well perfused, no suspicious lesions on exposed skin    Labs & Results:    Admission on 12/10/2017   Component Date Value    WBC 12/10/2017 8 59     RBC 12/10/2017 4 48     Hemoglobin 12/10/2017 12 2     Hematocrit 12/10/2017 39 1     MCV 12/10/2017 87     MCH 12/10/2017 27 2     MCHC 12/10/2017 31 2*    RDW 12/10/2017 13 9     MPV 12/10/2017 11 6     Platelets 49/83/3151 251     nRBC 12/10/2017 0     Neutrophils Relative 12/10/2017 60     Lymphocytes Relative 12/10/2017 33     Monocytes Relative 12/10/2017 5     Eosinophils Relative 12/10/2017 2     Basophils Relative 12/10/2017 0     Neutrophils Absolute 12/10/2017 5 20     Lymphocytes Absolute 12/10/2017 2 80     Monocytes Absolute 12/10/2017 0 40     Eosinophils Absolute 12/10/2017 0 15     Basophils Absolute 12/10/2017 0 01     Sodium 12/10/2017 136     Potassium 12/10/2017 4 4     Chloride 12/10/2017 98*    CO2 12/10/2017 32     Anion Gap 12/10/2017 6     BUN 12/10/2017 23     Creatinine 12/10/2017 1 44*    Glucose 12/10/2017 552*    Calcium 12/10/2017 9 5     AST 12/10/2017 10     ALT 12/10/2017 25     Alkaline Phosphatase 12/10/2017 143*    Total Protein 12/10/2017 8 1     Albumin 12/10/2017 3 6     Total Bilirubin 12/10/2017 0 27     eGFR 12/10/2017 46     Ventricular Rate 12/11/2017 107     Atrial Rate 12/11/2017 107     MA Interval 12/11/2017 168     QRSD Interval 12/11/2017 94     QT Interval 12/11/2017 360     QTC Interval 12/11/2017 480     P Axis 12/11/2017 77     QRS Axis 12/11/2017 -15     T Wave Axis 12/11/2017 58     Color, UA 12/10/2017 Yellow     Clarity, UA 12/10/2017 Clear     EXT Glucose, UA 12/10/2017 >500     EXT Bilirubin, UA (Ref: * 12/10/2017 Neg     EXT Ketones, UA (Ref: Ne* 12/10/2017 Neg     EXT Spec Grav, UA 12/10/2017 1 010     EXT Blood, UA (Ref: Nega* 12/10/2017 Trace-intact     EXT pH, UA 12/10/2017 5 5     EXT Protein, UA (Ref: Ne* 12/10/2017 Trace     EXT Urobilinogen, UA (Re* 12/10/2017 0 2     EXT Leukocytes, UA (Ref:* 12/10/2017 Neg     EXT Nitrite, UA (Ref: Ne* 12/10/2017 Neg     NT-proBNP 12/10/2017 787*    Acetone, Bld 12/10/2017 Negative     Magnesium 12/10/2017 2 1     Phosphorus 12/10/2017 3 9     POC Troponin I 12/10/2017 0 05     Specimen Type 12/10/2017 VENOUS     Color, UA 12/10/2017 Yellow     Clarity, UA 12/10/2017 Clear     pH, UA 12/10/2017 5 5     Leukocytes, UA 12/10/2017 Negative     Nitrite, UA 12/10/2017 Negative     Protein, UA 12/10/2017 Trace*    Glucose, UA 12/10/2017 500 (1/2%)*    Ketones, UA 12/10/2017 Negative     Urobilinogen, UA 12/10/2017 0 2     Bilirubin, UA 12/10/2017 Negative     Blood, UA 12/10/2017 Trace*    Specific Gravity, UA 12/10/2017 1 010     RBC, UA 12/10/2017 None Seen     WBC, UA 12/10/2017 None Seen     Epithelial Cells 12/10/2017 None Seen     Bacteria, UA 12/10/2017 Occasional     Hyaline Casts, UA 12/10/2017 None Seen     SODIUM, I-STAT 12/10/2017 137     Potassium, i-STAT 12/10/2017 4 5     Chloride, istat 12/10/2017 96*    CO2, i-STAT 12/10/2017 29     Anion Gap, Istat 12/10/2017 18*    Calcium, Ionized i-STAT 12/10/2017 1 17     BUN, I-STAT 12/10/2017 23     Creatinine, i-STAT 12/10/2017 1 3     eGFR 12/10/2017 52     Glucose, i-STAT 12/10/2017 553*    Hct, i-STAT 12/10/2017 39     Hgb, i-STAT 12/10/2017 13 3     Specimen Type 12/10/2017 VENOUS     Troponin I 12/10/2017 0 49*    Hemoglobin A1C 12/10/2017 11 2*    EAG 12/10/2017 275     TSH 3RD GENERATON 12/10/2017 0 531     Protime 12/11/2017 14 3     INR 12/11/2017 1 11     Platelets 98/01/1540 253     MPV 12/10/2017 11 9     Troponin I 12/11/2017 0 46*  Troponin I 12/11/2017 0 47*    POC Glucose 12/10/2017 380*    POC Glucose 12/10/2017 368*    POC Glucose 12/11/2017 389*    POC Glucose 12/11/2017 293*    Sodium 12/11/2017 136     Potassium 12/11/2017 3 8     Chloride 12/11/2017 100     CO2 12/11/2017 27     Anion Gap 12/11/2017 9     BUN 12/11/2017 23     Creatinine 12/11/2017 1 45*    Glucose 12/11/2017 227*    Calcium 12/11/2017 9 3     AST 12/11/2017 11     ALT 12/11/2017 18     Alkaline Phosphatase 12/11/2017 109     Total Protein 12/11/2017 6 9     Albumin 12/11/2017 3 2*    Total Bilirubin 12/11/2017 0 27     eGFR 12/11/2017 45     Magnesium 12/11/2017 2 0     Phosphorus 12/11/2017 2 8     WBC 12/11/2017 9 91     RBC 12/11/2017 4 03     Hemoglobin 12/11/2017 11 2*    Hematocrit 12/11/2017 35 0     MCV 12/11/2017 87     MCH 12/11/2017 27 8     MCHC 12/11/2017 32 0     RDW 12/11/2017 13 8     Platelets 88/19/8770 222     MPV 12/11/2017 11 2     POC Glucose 12/11/2017 245*    POC Glucose 12/11/2017 175*    POC Glucose 12/11/2017 161*    Ventricular Rate 12/11/2017 98     Atrial Rate 12/11/2017 98     NM Interval 12/11/2017 138     QRSD Interval 12/11/2017 94     QT Interval 12/11/2017 406     QTC Interval 12/11/2017 518     P Axis 12/11/2017 -20     QRS Axis 12/11/2017 3     T Wave Axis 12/11/2017 66     POC Glucose 12/11/2017 313*    Ventricular Rate 12/11/2017 81     Atrial Rate 12/11/2017 81     NM Interval 12/11/2017 118     QRSD Interval 12/11/2017 96     QT Interval 12/11/2017 442     QTC Interval 12/11/2017 513     P Axis 12/11/2017 186     QRS Axis 12/11/2017 -5     T Wave Axis 12/11/2017 0     POC Glucose 12/11/2017 236*    POC Glucose 12/11/2017 167*    POC Glucose 12/11/2017 374*    POC Glucose 12/11/2017 158*    POC Glucose 12/11/2017 120     POC Glucose 12/11/2017 490*    POC Glucose 12/12/2017 280*    Cortisol, Random 12/12/2017 9 4     Phenytoin Lvl 12/12/2017 1 7*    POC Glucose 12/12/2017 287*       Xr Chest 2 Views    Result Date: 12/11/2017  Narrative: CHEST INDICATION:  Chest pain, shortness of breath and vomiting  COMPARISON:  None VIEWS:  Frontal and lateral projections IMAGES:  2 FINDINGS:     The heart is enlarged  The pulmonary vessels appear normal  The lungs and pleural spaces are clear  Visualized osseous structures appear within normal limits for the patient's age  Impression: No acute abnormality in the chest  Workstation performed: [id number]     Ct Head Without Contrast    Result Date: 12/10/2017  Narrative: CT BRAIN - WITHOUT CONTRAST INDICATION:  Headache  Dizziness  Hypertension  COMPARISON:  None  TECHNIQUE:  CT examination of the brain was performed  In addition to axial images, coronal reformatted images were created and submitted for interpretation  Radiation dose length product (DLP) for this visit:  993 mGy-cm   This examination, like all CT scans performed in the [facility], was performed utilizing techniques to minimize radiation dose exposure, including the use of iterative reconstruction and automated exposure control  IMAGE QUALITY:  Diagnostic  FINDINGS:  PARENCHYMA:  Decreased attenuation is noted in the supratentorial white matter demonstrating an appearance most consistent with mild microangiopathic change  No intracranial mass, mass effect or midline shift  No CT signs of acute infarction  There is no parenchymal hemorrhage  VENTRICLES AND EXTRA-AXIAL SPACES:  Normal for patient's age  VISUALIZED ORBITS AND PARANASAL SINUSES:  Unremarkable  CALVARIUM AND EXTRACRANIAL SOFT TISSUES:   Normal      Impression: No acute intracranial abnormality  Workstation performed: [id number]     Mri Brain Wo Contrast    Result Date: 12/12/2017  Narrative: MRI BRAIN WITHOUT CONTRAST INDICATION:     COMPARISON:   None  TECHNIQUE:  Sagittal T1, axial T2, axial FLAIR, axial T1, axial Caruthers and axial diffusion imaging       IMAGE QUALITY: Diagnostic  FINDINGS: BRAIN PARENCHYMA:  There is a punctate focus of restricted diffusion in the right parietal corona radiata and periventricular white matter consistent with an acute lacunar infarct  T-2/FLAIR hyperintense foci in the periventricular and subcortical white matter likely represent microangiopathic disease  Punctate foci of susceptibility artifact in the left cerebellar hemisphere right thalamus and bilateral lentiform represent chronic microhemorrhage  No evidence of mass or mass effect  VENTRICLES:  The ventricles are normal in size and contour  SELLA AND PITUITARY GLAND:  Normal  ORBITS:  Normal  PARANASAL SINUSES:  Minimal mucosal thickening in maxillary sinuses without significant paranasal sinus disease  VASCULATURE:  Evaluation of the major intracranial vasculature demonstrates appropriate flow voids  CALVARIUM AND SKULL BASE:  Normal  EXTRACRANIAL SOFT TISSUES:  Normal      Impression: 1  Acute lacunar infarct in the right parietal corona radiata and periventricular white matter  2  Foci of chronic microhemorrhage in the basal ganglia and left cerebellar hemisphere suggestive of sequela of chronic hypertension  No acute hemorrhage or mass effect  3  Periventricular and subcortical white matter lesions, likely to represent microangiopathic disease  Workstation performed: [id number]       EKG personally reviewed by Tima Walsh MD      Counseling / Coordination of Care  Total floor / unit time spent today 30 minutes  Greater than 50% of total time was spent with the patient and / or family counseling and / or coordination of care

## 2017-12-12 NOTE — PROCEDURES
ELECTROENCEPHALOGRAM (EEG)      Patient Name:  Tamie Santos  MRN: [id number]   :  1960 File #: David Schwartz 13-1   Age: 59 y o  Encounter #: [id number]   Date performed: 2017            Report date: 2017          Study type: Routine EEG    ICD 10 diagnosis: Other Epilepsy G40 909    Start time: 10:45 End time: 11:21     -------------------------------------------------------------------------------------------------------------------   Patient History: This recording was observed in a 59 y o  female with epilepsy to better characterize epilepsy  Medications include:   amLODIPine 5 mg Oral Q12H   aspirin 81 mg Oral Daily   cloNIDine 0 2 mg Oral BID   clopidogrel 75 mg Oral Daily   docusate sodium 100 mg Oral BID   enoxaparin 40 mg Subcutaneous Daily   hydrALAZINE 10 mg Intravenous Q6H [address]   insulin glargine 55 Units Subcutaneous Q12H [address]   insulin lispro 1-6 Units Subcutaneous HS   insulin lispro 10 Units Subcutaneous Once   insulin lispro 22 Units Subcutaneous TID With Meals   insulin lispro 4-20 Units Subcutaneous TID AC   levETIRAcetam 1,000 mg Oral Q12H [address]   metoprolol succinate 50 mg Oral Daily   pantoprazole 40 mg Intravenous Q24H NISH   phenytoin 100 mg Oral TID   phenytoin 100 mg Oral HS   senna 1 tablet Oral Daily       -------------------------------------------------------------------------------------------------------------------   Description of Procedure:  · 32 channel digital recording with electrodes placed according to the International 10-20 system with additional T1/T2 electrodes, EOG, EKG, and simultaneous video  The recording was technically satisfactory  -------------------------------------------------------------------------------------------------------------------   EEG Description:    The recording was performed with the patient awake  She was fully oriented      During wakefulness, there were long runs of well regulated, low amplitude, posteriorly dominant, symmetric 11-12 cps alpha rhythm that attenuated with eye opening  There were symmetric low amplitude, frontally dominant beta activities  There were intermittent semi-rhythmic 3-4 cps delta activities seen independently in the right and left temporal areas  Drowsiness and sleep were not attained  No epileptiform discharges were seen  -------------------------------------------------------------------------------------------------------------------   Activation Procedures:  Hyperventilation was not performed  Stepped photic stimulation between 1-20 cps was performed and did not induce any changes  Other findings: The single lead ECG demonstrated regular rhythm    -------------------------------------------------------------------------------------------------------------------   EEG Interpretation: This Routine EEG recorded during wakefulness is abnormal  Independent right and left temporal delta activities suggests areas of underlying neuronal dysfunction in the right and left temporal delta areas       Maude Skiff, MD   [address] [facility]

## 2017-12-12 NOTE — PROGRESS NOTES
Progress Note - Keisha Kate 59 y o  female MRN: [id number]    Unit/Bed#: [facility] 531-01 Encounter: [id number]      CC: diabetes f/u    Subjective:   Keisha Kate is a 59y o  year old female with type 2 diabetes  Feels well  No complaints  No hypoglycemia  Hyperglycemia today so far but received 2nd dose of Levemir this morning  Objective:     Vitals: Blood pressure 148/68, pulse 73, temperature 98 5 °F (36 9 °C), temperature source Oral, resp  rate 18, height 5' 4" (1 626 m), weight 109 kg (240 lb 3 2 oz), SpO2 95 %  ,Body mass index is 41 23 kg/m²  Intake/Output Summary (Last 24 hours) at 12/12/17 1359  Last data filed at 12/12/17 1332   Gross per 24 hour   Intake              720 ml   Output              880 ml   Net             -160 ml       Physical Exam:  General: No acute distress  Alert, awake, and oriented x3  HEENT: Normocephalic, atraumatic  Heart: Regular rate and rhythm  Lungs: Clear  No respiratory distress  Extremities: No edema  Skin: Warm, dry  No rash  Neuro: Moves all 4 extremities spontaneously  Psych: Appropriate mood and affect  Cooperative  Lab, Imaging and other studies: I have personally reviewed pertinent reports  POC Glucose (mg/dl)   Date Value   12/12/2017 330 (H)   12/12/2017 287 (H)   12/12/2017 280 (H)   12/11/2017 490 (H)   12/11/2017 120   12/11/2017 158 (H)   12/11/2017 374 (H)   12/11/2017 167 (H)   12/11/2017 236 (H)   12/11/2017 313 (H)       Assessment:  DM 2 with hyperglycemia, long term insulin use, ?noncompliance    Plan:  High today, but since she received 2nd dose of BID Levemir regimen this morning, will continue this dose of Levemir for now  Increase Humalog to 22 units ac  Continue to monitor and adjust as needed  Monitor for hypoglycemia as doses are increased

## 2017-12-13 LAB
CHOLEST SERPL-MCNC: 163 MG/DL (ref 50–200)
GLUCOSE SERPL-MCNC: 180 MG/DL (ref 65–140)
GLUCOSE SERPL-MCNC: 207 MG/DL (ref 65–140)
GLUCOSE SERPL-MCNC: 331 MG/DL (ref 65–140)
GLUCOSE SERPL-MCNC: 90 MG/DL (ref 65–140)
HDLC SERPL-MCNC: 64 MG/DL (ref 40–60)
LDLC SERPL CALC-MCNC: 69 MG/DL (ref 0–100)
PHENYTOIN FREE SERPL-MCNC: NORMAL UG/ML (ref 1–2)
TRIGL SERPL-MCNC: 152 MG/DL

## 2017-12-13 PROCEDURE — C9113 INJ PANTOPRAZOLE SODIUM, VIA: HCPCS | Performed by: PHYSICIAN ASSISTANT

## 2017-12-13 PROCEDURE — G8988 SELF CARE GOAL STATUS: HCPCS

## 2017-12-13 PROCEDURE — 97167 OT EVAL HIGH COMPLEX 60 MIN: CPT

## 2017-12-13 PROCEDURE — 82948 REAGENT STRIP/BLOOD GLUCOSE: CPT

## 2017-12-13 PROCEDURE — 80061 LIPID PANEL: CPT | Performed by: PHYSICIAN ASSISTANT

## 2017-12-13 PROCEDURE — G8987 SELF CARE CURRENT STATUS: HCPCS

## 2017-12-13 RX ORDER — METOPROLOL SUCCINATE 50 MG/1
50 TABLET, EXTENDED RELEASE ORAL EVERY 12 HOURS
Status: DISCONTINUED | OUTPATIENT
Start: 2017-12-13 | End: 2017-12-15 | Stop reason: HOSPADM

## 2017-12-13 RX ORDER — VALSARTAN 160 MG/1
160 TABLET ORAL EVERY 12 HOURS SCHEDULED
Status: DISCONTINUED | OUTPATIENT
Start: 2017-12-13 | End: 2017-12-14

## 2017-12-13 RX ORDER — ENALAPRILAT 2.5 MG/2ML
1.25 INJECTION INTRAVENOUS EVERY 6 HOURS PRN
Status: DISCONTINUED | OUTPATIENT
Start: 2017-12-13 | End: 2017-12-15 | Stop reason: HOSPADM

## 2017-12-13 RX ADMIN — VALSARTAN 160 MG: 160 TABLET ORAL at 11:15

## 2017-12-13 RX ADMIN — AMLODIPINE BESYLATE 5 MG: 5 TABLET ORAL at 08:30

## 2017-12-13 RX ADMIN — PANTOPRAZOLE SODIUM 40 MG: 40 INJECTION, POWDER, FOR SOLUTION INTRAVENOUS at 08:24

## 2017-12-13 RX ADMIN — HYDRALAZINE HYDROCHLORIDE 10 MG: 20 INJECTION INTRAMUSCULAR; INTRAVENOUS at 00:00

## 2017-12-13 RX ADMIN — METOPROLOL SUCCINATE 50 MG: 50 TABLET, EXTENDED RELEASE ORAL at 08:23

## 2017-12-13 RX ADMIN — INSULIN LISPRO 4 UNITS: 100 INJECTION, SOLUTION INTRAVENOUS; SUBCUTANEOUS at 08:19

## 2017-12-13 RX ADMIN — DOCUSATE SODIUM 100 MG: 100 CAPSULE, LIQUID FILLED ORAL at 08:22

## 2017-12-13 RX ADMIN — VALSARTAN 160 MG: 160 TABLET ORAL at 22:18

## 2017-12-13 RX ADMIN — ACETAMINOPHEN 975 MG: 325 TABLET, FILM COATED ORAL at 05:55

## 2017-12-13 RX ADMIN — LEVETIRACETAM 1000 MG: 500 TABLET ORAL at 22:14

## 2017-12-13 RX ADMIN — ASPIRIN 81 MG 81 MG: 81 TABLET ORAL at 08:19

## 2017-12-13 RX ADMIN — INSULIN LISPRO 2 UNITS: 100 INJECTION, SOLUTION INTRAVENOUS; SUBCUTANEOUS at 22:20

## 2017-12-13 RX ADMIN — SENNOSIDES 8.6 MG: 8.6 TABLET, FILM COATED ORAL at 08:20

## 2017-12-13 RX ADMIN — INSULIN GLARGINE 55 UNITS: 100 INJECTION, SOLUTION SUBCUTANEOUS at 08:19

## 2017-12-13 RX ADMIN — INSULIN GLARGINE 55 UNITS: 100 INJECTION, SOLUTION SUBCUTANEOUS at 22:13

## 2017-12-13 RX ADMIN — LEVETIRACETAM 1000 MG: 500 TABLET ORAL at 08:22

## 2017-12-13 RX ADMIN — HYDRALAZINE HYDROCHLORIDE 10 MG: 20 INJECTION INTRAMUSCULAR; INTRAVENOUS at 05:09

## 2017-12-13 RX ADMIN — METOPROLOL SUCCINATE 50 MG: 50 TABLET, EXTENDED RELEASE ORAL at 22:17

## 2017-12-13 RX ADMIN — PHENYTOIN SODIUM 100 MG: 100 CAPSULE ORAL at 16:39

## 2017-12-13 RX ADMIN — AMLODIPINE BESYLATE 5 MG: 5 TABLET ORAL at 22:14

## 2017-12-13 RX ADMIN — CLOPIDOGREL BISULFATE 75 MG: 75 TABLET ORAL at 08:19

## 2017-12-13 RX ADMIN — CLONIDINE HYDROCHLORIDE 0.2 MG: 0.2 TABLET ORAL at 17:49

## 2017-12-13 RX ADMIN — PHENYTOIN SODIUM 100 MG: 100 CAPSULE ORAL at 22:18

## 2017-12-13 RX ADMIN — PHENYTOIN SODIUM 100 MG: 100 CAPSULE ORAL at 08:31

## 2017-12-13 RX ADMIN — DOCUSATE SODIUM 100 MG: 100 CAPSULE, LIQUID FILLED ORAL at 17:49

## 2017-12-13 RX ADMIN — ATORVASTATIN CALCIUM 40 MG: 40 TABLET, FILM COATED ORAL at 16:39

## 2017-12-13 RX ADMIN — PHENYTOIN SODIUM 100 MG: 100 CAPSULE ORAL at 22:20

## 2017-12-13 RX ADMIN — ENOXAPARIN SODIUM 40 MG: 40 INJECTION SUBCUTANEOUS at 08:24

## 2017-12-13 RX ADMIN — CLONIDINE HYDROCHLORIDE 0.2 MG: 0.2 TABLET ORAL at 08:20

## 2017-12-13 RX ADMIN — INSULIN LISPRO 16 UNITS: 100 INJECTION, SOLUTION INTRAVENOUS; SUBCUTANEOUS at 11:14

## 2017-12-13 NOTE — PLAN OF CARE
Problem: OCCUPATIONAL THERAPY ADULT  Goal: Performs self-care activities at highest level of function for planned discharge setting  See evaluation for individualized goals  Treatment Interventions: ADL retraining, Functional transfer training, Endurance training, Cognitive reorientation, Patient/family training, Equipment evaluation/education, Compensatory technique education, Continued evaluation, Energy conservation, Activityengagement          See flowsheet documentation for full assessment, interventions and recommendations  Limitation: Decreased ADL status, Decreased endurance, Decreased Safe judgement during ADL, Decreased cognition, Decreased self-care trans, Decreased high-level ADLs  Prognosis: Fair  Assessment: Pt is a 58 y/o female seen for OT eval s/p adm to [facility] w/ altered mental status  In the ER pt presented w/ hypertensive urgency and elevated blood sugars  Pt dx'd w/ toxic metabolic encephalopathy, and ROSELIA  MRI revealed acute ischemic stroke  Pt may have had seizure PTA, EEG performed  Comorbidities include a h/o CHF, cardiac disease, COPD, DM, HTN, and seizures  Per chart review pt is non-compliant w/ medications  Pt with active OT orders and up with assistance orders  Pt reports she lives with her granddaughter in an apartment w/ 4+3 SANDRA  However, per CM note, pt lives with SO in apartment w/ elevator access  Pt reports some A w/  ADLS and IADLS PTA  Pt reports she required no use of DME PTA  Pt is currently demonstrating the following occupational deficits: min A UB ADLS, max A LB ADLS, min A functional transfers and min A functional mobility using rw  Pt with deficits and limitations in all baseline areas of occupation 2* decreased endurance/activity tolerance, impaired balance, decreased transfer status, decreased mobility status, impaired sensation, fatigue, decreased cognition, decreased safety awareness and impaired judgement, multiple lines and continuous BP/HR/SPO2 monitoring   The following Occupational Performance Areas to address include: bathing/shower, toilet hygiene, dressing, medication management, functional mobility, community mobility and clothing management  Pt scored overall 55/100 on the Barthel Index and a 4 on the Modified [place] Scale  Based on the aforementioned OT evaluation, functional performance deficits, and assessments, pt has been identified as a high complexity evaluation  Recommend STR upon D/C   Pt to continue to benefit from acute immediate OT services to address the following goals 3-5x/wk to  w/in 7-10 days:     OT Discharge Recommendation: Short Term Rehab  OT - OK to Discharge: Yes (to STR when medically stable)    Haider Moreno MS, OTR/L

## 2017-12-13 NOTE — PROGRESS NOTES
Patient requested to have $500 returned to her from security to give to her daughter to pay bills  She then requested to have $260 put back into security

## 2017-12-13 NOTE — PROGRESS NOTES
Rounded with [facility] Dr Alycia Reed at bedside  Patient status and plan discussed  Family visiting at bedside  Will downgrade patient to Med-Surg with Tele  Continue to monitor

## 2017-12-13 NOTE — PROGRESS NOTES
Cardiology Progress Note - Cherry Nolen 59 y o  female MRN: [id number]    Unit/Bed#: [facility] 531-01 Encounter: [id number]      Assessment:  Principal Problem:    Hypertensive urgency  Active Problems:    Toxic metabolic encephalopathy    Cardiac disease    COPD (chronic obstructive pulmonary disease) (HCC)    CHF (congestive heart failure) (HCC)    Diabetes mellitus (HCC)    Seizures ([facility] [address] )    Acute kidney injury ([facility] [address] )      Plan:  Patient is comfortable this morning  She has no chest pain or significant dyspnea  She is in sinus rhythm on telemetry  The patient's blood pressure remains high  I will add valsartan  Her hypertension is likely chronic given significant left ventricular hypertrophy noted on her echocardiogram   I will also titrate the dose of her metoprolol succinate  Subjective:   Patient seen and examined  No significant events overnight   negative  Objective:     Vitals: Blood pressure (!) 195/86, pulse 80, temperature 98 5 °F (36 9 °C), temperature source Oral, resp  rate 18, height 5' 4" (1 626 m), weight 109 kg (240 lb 3 2 oz), SpO2 98 %  , Body mass index is 41 23 kg/m² , Orthostatic Blood Pressures    Flowsheet Row Most Recent Value   Blood Pressure   195/86 filed at 12/13/2017 0819   Patient Position - Orthostatic VS  Lying filed at 12/13/2017 0718      ,      Intake/Output Summary (Last 24 hours) at 12/13/17 0828  Last data filed at 12/13/17 0600   Gross per 24 hour   Intake              512 ml   Output             1630 ml   Net            -1118 ml       No significant arrhythmias seen on telemetry review         Physical Exam:    GEN: Cherry Nolen appears well, alert and oriented x 3, pleasant and cooperative   NECK: supple, no carotid bruits, no JVD or HJR  HEART: normal rate, regular rhythm, normal S1 and S2, no murmurs, clicks, gallops or rubs   LUNGS: clear to auscultation bilaterally; no wheezes, rales, or rhonchi   ABDOMEN: normal bowel sounds, soft, no tenderness, no distention  EXTREMITIES: peripheral pulses normal; no clubbing, cyanosis, or edema  SKIN: warm and well perfused, no suspicious lesions on exposed skin    Labs & Results:    Admission on 12/10/2017   Component Date Value    WBC 12/10/2017 8 59     RBC 12/10/2017 4 48     Hemoglobin 12/10/2017 12 2     Hematocrit 12/10/2017 39 1     MCV 12/10/2017 87     MCH 12/10/2017 27 2     MCHC 12/10/2017 31 2*    RDW 12/10/2017 13 9     MPV 12/10/2017 11 6     Platelets 81/09/3526 251     nRBC 12/10/2017 0     Neutrophils Relative 12/10/2017 60     Lymphocytes Relative 12/10/2017 33     Monocytes Relative 12/10/2017 5     Eosinophils Relative 12/10/2017 2     Basophils Relative 12/10/2017 0     Neutrophils Absolute 12/10/2017 5 20     Lymphocytes Absolute 12/10/2017 2 80     Monocytes Absolute 12/10/2017 0 40     Eosinophils Absolute 12/10/2017 0 15     Basophils Absolute 12/10/2017 0 01     Sodium 12/10/2017 136     Potassium 12/10/2017 4 4     Chloride 12/10/2017 98*    CO2 12/10/2017 32     Anion Gap 12/10/2017 6     BUN 12/10/2017 23     Creatinine 12/10/2017 1 44*    Glucose 12/10/2017 552*    Calcium 12/10/2017 9 5     AST 12/10/2017 10     ALT 12/10/2017 25     Alkaline Phosphatase 12/10/2017 143*    Total Protein 12/10/2017 8 1     Albumin 12/10/2017 3 6     Total Bilirubin 12/10/2017 0 27     eGFR 12/10/2017 46     Ventricular Rate 12/11/2017 107     Atrial Rate 12/11/2017 107     [place] Interval 12/11/2017 168     QRSD Interval 12/11/2017 94     QT Interval 12/11/2017 360     QTC Interval 12/11/2017 480     P Axis 12/11/2017 77     QRS Axis 12/11/2017 -15     T Wave Axis 12/11/2017 58     Color, UA 12/10/2017 Yellow     Clarity, UA 12/10/2017 Clear     EXT Glucose, UA 12/10/2017 >500     EXT Bilirubin, UA (Ref: * 12/10/2017 Neg     EXT Ketones, UA (Ref: Ne* 12/10/2017 Neg     EXT Spec Grav, UA 12/10/2017 1 010     EXT Blood, UA (Ref: Nega* 12/10/2017 Trace-intact     EXT pH, UA 12/10/2017 5 5     EXT Protein, UA (Ref: Ne* 12/10/2017 Trace     EXT Urobilinogen, UA (Re* 12/10/2017 0 2     EXT Leukocytes, UA (Ref:* 12/10/2017 Neg     EXT Nitrite, UA (Ref: Ne* 12/10/2017 Neg     NT-proBNP 12/10/2017 787*    Acetone, Bld 12/10/2017 Negative     Magnesium 12/10/2017 2 1     Phosphorus 12/10/2017 3 9     POC Troponin I 12/10/2017 0 05     Specimen Type 12/10/2017 VENOUS     Color, UA 12/10/2017 Yellow     Clarity, UA 12/10/2017 Clear     pH, UA 12/10/2017 5 5     Leukocytes, UA 12/10/2017 Negative     Nitrite, UA 12/10/2017 Negative     Protein, UA 12/10/2017 Trace*    Glucose, UA 12/10/2017 500 (1/2%)*    Ketones, UA 12/10/2017 Negative     Urobilinogen, UA 12/10/2017 0 2     Bilirubin, UA 12/10/2017 Negative     Blood, UA 12/10/2017 Trace*    Specific Gravity, UA 12/10/2017 1 010     RBC, UA 12/10/2017 None Seen     WBC, UA 12/10/2017 None Seen     Epithelial Cells 12/10/2017 None Seen     Bacteria, UA 12/10/2017 Occasional     Hyaline Casts, UA 12/10/2017 None Seen     SODIUM, I-STAT 12/10/2017 137     Potassium, i-STAT 12/10/2017 4 5     Chloride, istat 12/10/2017 96*    CO2, i-STAT 12/10/2017 29     Anion Gap, Istat 12/10/2017 18*    Calcium, Ionized i-STAT 12/10/2017 1 17     BUN, I-STAT 12/10/2017 23     Creatinine, i-STAT 12/10/2017 1 3     eGFR 12/10/2017 52     Glucose, i-STAT 12/10/2017 553*    Hct, i-STAT 12/10/2017 39     Hgb, i-STAT 12/10/2017 13 3     Specimen Type 12/10/2017 VENOUS     Troponin I 12/10/2017 0 49*    Hemoglobin A1C 12/10/2017 11 2*    EAG 12/10/2017 275     TSH 3RD GENERATON 12/10/2017 0 531     Protime 12/11/2017 14 3     INR 12/11/2017 1 11     Platelets 73/12/1323 253     MPV 12/10/2017 11 9     Troponin I 12/11/2017 0 46*    Troponin I 12/11/2017 0 47*    POC Glucose 12/10/2017 380*    POC Glucose 12/10/2017 368*    POC Glucose 12/11/2017 389*    POC Glucose 12/11/2017 293*    Sodium 12/11/2017 136     Potassium 12/11/2017 3 8     Chloride 12/11/2017 100     CO2 12/11/2017 27     Anion Gap 12/11/2017 9     BUN 12/11/2017 23     Creatinine 12/11/2017 1 45*    Glucose 12/11/2017 227*    Calcium 12/11/2017 9 3     AST 12/11/2017 11     ALT 12/11/2017 18     Alkaline Phosphatase 12/11/2017 109     Total Protein 12/11/2017 6 9     Albumin 12/11/2017 3 2*    Total Bilirubin 12/11/2017 0 27     eGFR 12/11/2017 45     Magnesium 12/11/2017 2 0     Phosphorus 12/11/2017 2 8     WBC 12/11/2017 9 91     RBC 12/11/2017 4 03     Hemoglobin 12/11/2017 11 2*    Hematocrit 12/11/2017 35 0     MCV 12/11/2017 87     MCH 12/11/2017 27 8     MCHC 12/11/2017 32 0     RDW 12/11/2017 13 8     Platelets 92/07/9563 222     MPV 12/11/2017 11 2     POC Glucose 12/11/2017 245*    POC Glucose 12/11/2017 175*    POC Glucose 12/11/2017 161*    Ventricular Rate 12/11/2017 98     Atrial Rate 12/11/2017 98     SD Interval 12/11/2017 138     QRSD Interval 12/11/2017 94     QT Interval 12/11/2017 406     QTC Interval 12/11/2017 518     P Axis 12/11/2017 -20     QRS Axis 12/11/2017 3     T Wave Axis 12/11/2017 66     POC Glucose 12/11/2017 313*    Ventricular Rate 12/11/2017 81     Atrial Rate 12/11/2017 81     SD Interval 12/11/2017 118     QRSD Interval 12/11/2017 96     QT Interval 12/11/2017 442     QTC Interval 12/11/2017 513     P Axis 12/11/2017 186     QRS Axis 12/11/2017 -5     T Wave Axis 12/11/2017 0     POC Glucose 12/11/2017 236*    POC Glucose 12/11/2017 167*    POC Glucose 12/11/2017 374*    POC Glucose 12/11/2017 158*    POC Glucose 12/11/2017 120     POC Glucose 12/11/2017 490*    POC Glucose 12/12/2017 280*    Cortisol, Random 12/12/2017 9 4     Phenytoin Lvl 12/12/2017 1 7*    POC Glucose 12/12/2017 287*    Aspirin Response Test 12/12/2017 539     P2Y12 12/12/2017 340     POC Glucose 12/12/2017 330*    Sodium 12/12/2017 136     Potassium 12/12/2017 3 8     Chloride 12/12/2017 102     CO2 12/12/2017 27     Anion Gap 12/12/2017 7     BUN 12/12/2017 23     Creatinine 12/12/2017 1 23     Glucose 12/12/2017 294*    Calcium 12/12/2017 9 0     eGFR 12/12/2017 55     POC Glucose 12/12/2017 107     POC Glucose 12/12/2017 100     POC Glucose 12/12/2017 225*    Cholesterol 12/13/2017 163     Triglycerides 12/13/2017 152*    HDL, Direct 12/13/2017 64*    LDL Calculated 12/13/2017 69     POC Glucose 12/13/2017 180*       Xr Chest 2 Views    Result Date: 12/11/2017  Narrative: CHEST INDICATION:  Chest pain, shortness of breath and vomiting  COMPARISON:  None VIEWS:  Frontal and lateral projections IMAGES:  2 FINDINGS:     The heart is enlarged  The pulmonary vessels appear normal  The lungs and pleural spaces are clear  Visualized osseous structures appear within normal limits for the patient's age  Impression: No acute abnormality in the chest  Workstation performed: [id number]     Ct Head Without Contrast    Result Date: 12/10/2017  Narrative: CT BRAIN - WITHOUT CONTRAST INDICATION:  Headache  Dizziness  Hypertension  COMPARISON:  None  TECHNIQUE:  CT examination of the brain was performed  In addition to axial images, coronal reformatted images were created and submitted for interpretation  Radiation dose length product (DLP) for this visit:  993 mGy-cm   This examination, like all CT scans performed in the [facility], was performed utilizing techniques to minimize radiation dose exposure, including the use of iterative reconstruction and automated exposure control  IMAGE QUALITY:  Diagnostic  FINDINGS:  PARENCHYMA:  Decreased attenuation is noted in the supratentorial white matter demonstrating an appearance most consistent with mild microangiopathic change  No intracranial mass, mass effect or midline shift  No CT signs of acute infarction  There is no parenchymal hemorrhage       VENTRICLES AND EXTRA-AXIAL SPACES:  Normal for patient's age  VISUALIZED ORBITS AND PARANASAL SINUSES:  Unremarkable  CALVARIUM AND EXTRACRANIAL SOFT TISSUES:   Normal      Impression: No acute intracranial abnormality  Workstation performed: [id number]     Mri Brain Wo Contrast    Result Date: 12/12/2017  Narrative: MRI BRAIN WITHOUT CONTRAST INDICATION:     COMPARISON:   None  TECHNIQUE:  Sagittal T1, axial T2, axial FLAIR, axial T1, axial [place] and axial diffusion imaging  IMAGE QUALITY:  Diagnostic  FINDINGS: BRAIN PARENCHYMA:  There is a punctate focus of restricted diffusion in the right parietal corona radiata and periventricular white matter consistent with an acute lacunar infarct  T-2/FLAIR hyperintense foci in the periventricular and subcortical white matter likely represent microangiopathic disease  Punctate foci of susceptibility artifact in the left cerebellar hemisphere right thalamus and bilateral lentiform represent chronic microhemorrhage  No evidence of mass or mass effect  VENTRICLES:  The ventricles are normal in size and contour  SELLA AND PITUITARY GLAND:  Normal  ORBITS:  Normal  PARANASAL SINUSES:  Minimal mucosal thickening in maxillary sinuses without significant paranasal sinus disease  VASCULATURE:  Evaluation of the major intracranial vasculature demonstrates appropriate flow voids  CALVARIUM AND SKULL BASE:  Normal  EXTRACRANIAL SOFT TISSUES:  Normal      Impression: 1  Acute lacunar infarct in the right parietal corona radiata and periventricular white matter  2  Foci of chronic microhemorrhage in the basal ganglia and left cerebellar hemisphere suggestive of sequela of chronic hypertension  No acute hemorrhage or mass effect  3  Periventricular and subcortical white matter lesions, likely to represent microangiopathic disease   Workstation performed: [id number]     Vas Carotid Complete Study    Result Date: 12/12/2017  Narrative:  [facility] REPORT CLINICAL: Indications: Patient presents with altered mental status and CVA  Physician wants to rule out carotid artery stenosis  Operative History Cardiac catheter Cholecystectomy Risk Factors The patient has history of obesity, HTN, Diabetes, CHF, seizure disorder, pulmonary status, and renal disease  The patient's current BMI is 41 19, Weight (lb) is 240 lb and Height (in) is 64 in  She is a non-smoker  Clinical Right Brachial Pressure:  162/79 mmHg, Left:  IV site  FINDINGS:  Right        Impression  PSV  EDV (cm/s)  Direction of Flow  Ratio  Dist  ICA                 65          21                      0 60  Mid  ICA                  58          19                      0 53  Prox  ICA    1 - 49%      69          15                      0 63  Dist CCA                  76          11                            Mid CCA                  109          18                      1 00  Prox CCA                 109          16                            Ext Carotid              159          17                      1 46  Prox Vert                 60          11  Antegrade                 Subclavian               106          12                             Left         Impression  PSV  EDV (cm/s)  Direction of Flow  Ratio  Dist  ICA                 75          19                      0 67  Mid  ICA                  74          14                      0 66  Prox  ICA    1 - 49%      85          20                      0 75  Dist CCA                  85          16                            Mid CCA                  112          17                      0 90  Prox CCA                 124          19                            Ext Carotid              119          10                      1 06  Prox Vert                 83          16  Antegrade                 Subclavian               103           7                               CONCLUSION:  Impression  RIGHT: There is <50% stenosis noted in the internal carotid artery  Plaque is heterogenous and smooth  Vertebral artery flow is antegrade  There is no significant subclavian artery disease  LEFT: There is <50% stenosis noted in the internal carotid artery  Plaque is heterogenous and smooth  Vertebral artery flow is antegrade  There is no significant subclavian artery disease  TECH NOTE: There is minimal plaque identified in the B/L ICA  Technically difficult bedside study  Internal carotid artery stenosis determination by consensus criteria from: Poonam Brown et al  Carotid Artery Stenosis: Gray-Scale and Doppler US Diagnosis - Society of Radiologists in [address] [place] [address], Radiology 2003; [phone]  SIGNATURE: Electronically Signed by: Nilo Solo MD on 2017-12-12 05:11:17 PM      EKG personally reviewed by Joaquin Thakur MD      Counseling / Coordination of Care  Total floor / unit time spent today 30 minutes  Greater than 50% of total time was spent with the patient and / or family counseling and / or coordination of care

## 2017-12-13 NOTE — PLAN OF CARE
CARDIOVASCULAR - ADULT     Maintains optimal cardiac output and hemodynamic stability Progressing     Absence of cardiac dysrhythmias or at baseline rhythm Progressing        DISCHARGE PLANNING - CARE MANAGEMENT     Discharge to post-acute care or home with appropriate resources Progressing        GASTROINTESTINAL - ADULT     Minimal or absence of nausea and/or vomiting Progressing     Maintains adequate nutritional intake Progressing        HEMATOLOGIC - ADULT     Maintains hematologic stability Progressing        METABOLIC, FLUID AND ELECTROLYTES - ADULT     Electrolytes maintained within normal limits Progressing     Fluid balance maintained Progressing     Glucose maintained within target range Progressing        Nutrition/Hydration-ADULT     Nutrient/Hydration intake appropriate for improving, restoring or maintaining nutritional needs Progressing        Potential for Falls     Patient will remain free of falls Progressing        Prexisting or High Potential for Compromised Skin Integrity     Skin integrity is maintained or improved Progressing        SKIN/TISSUE INTEGRITY - ADULT     Skin integrity remains intact Progressing     Incision(s), wounds(s) or drain site(s) healing without S/S of infection Progressing     Oral mucous membranes remain intact Progressing

## 2017-12-13 NOTE — SOCIAL WORK
Call was received from [facility] admissions reporting patient and family now want rehab closer to home in [place]  Met with patient, her daughter-Suzie Pham cell # [phone], and her sonsFernando Haney and [place] cell # [phone]  Provided lists of acute rehab for [facility] where gomez lives and [facility] where son lives  [facility] referrals made to [facility]  Calls received from Jeffry Lizarraga [phone] and more clinical information was sent via [facility]  They are checking benefits

## 2017-12-13 NOTE — PROGRESS NOTES
[facility] Hospitalist Service - Internal Medicine Progress Note  Patient: Chris Horne 59 y o  female   MRN: [id number]  PCP: No primary care provider on file  Unit/Bed#: [facility] 531-01 Encounter: [id number]  Date Of Visit: 17         Subjective:   Seen examined with family at bedside today  We had a long discussion about the patient's diagnosis and management along with long-term rehabilitation needs  The family has also been counseled on monitoring of the patient's compliance to medications along with BP and blood sugar control  Objective:     Vitals:   Temp (24hrs), Av 3 °F (36 8 °C), Min:97 9 °F (36 6 °C), Max:98 5 °F (36 9 °C)    HR:  [69-80] 69  Resp:  [18-20] 20  BP: (120-195)/() 142/88  SpO2:  [96 %-98 %] 98 %  Body mass index is 41 23 kg/m²  Input and Output Summary (last 24 hours):        Intake/Output Summary (Last 24 hours) at 17 1720  Last data filed at 17 1648   Gross per 24 hour   Intake              472 ml   Output             2000 ml   Net            -1528 ml       Physical Exam:     GENERAL:  Obese - no current distress  HEAD:  Normocephalic - atraumatic  EYES: PERRL - EOMI   MOUTH:  Mucosa moist  NECK:  Supple - full range of motion  CARDIAC:  Regular rate/rhythm - S1/S2 positive  PULMONARY:  Clear to auscultation bilaterally - nonlabored respirations  ABDOMEN:  Soft - nontender/nondistended - active bowel sounds  MUSCULOSKELETAL:  Motor strength/range of motion deconditioned  NEUROLOGIC:  Alert/oriented x 3 - no nystagmus - no current deficits noted  SKIN:  Chronic wrinkles/blemishes   PSYCHIATRIC:  Mood/affect pleasant today      Additional Data:    Labs & Recent Cultures:       Results from last 7 days  Lab Units 17  0437  12/10/17  1730   WBC Thousand/uL 9 91  --  8 59   HEMOGLOBIN g/dL 11 2*  --  12 2   I STAT HEMOGLOBIN   --   < >  --    HEMATOCRIT % 35 0  --  39 1   PLATELETS Thousands/uL 222  < > 251   NEUTROS PCT %  --   --  60 LYMPHS PCT %  --   --  33   MONOS PCT %  --   --  5   EOS PCT %  --   --  2   < > = values in this interval not displayed  Results from last 7 days  Lab Units 12/12/17  1209 12/11/17  0437   SODIUM mmol/L 136 136   POTASSIUM mmol/L 3 8 3 8   CHLORIDE mmol/L 102 100   CO2 mmol/L 27 27   BUN mg/dL 23 23   CREATININE mg/dL 1 23 1 45*   CALCIUM mg/dL 9 0 9 3   TOTAL PROTEIN g/dL  --  6 9   BILIRUBIN TOTAL mg/dL  --  0 27   ALK PHOS U/L  --  109   ALT U/L  --  18   AST U/L  --  11   GLUCOSE RANDOM mg/dL 294* 227*       Results from last 7 days  Lab Units 12/11/17  0455   INR  1 11                 Last 24 Hours Medication List:     amLODIPine 5 mg Oral Q12H   aspirin 81 mg Oral Daily   atorvastatin 40 mg Oral Daily With Dinner   cloNIDine 0 2 mg Oral BID   clopidogrel 75 mg Oral Daily   docusate sodium 100 mg Oral BID   enoxaparin 40 mg Subcutaneous Daily   insulin glargine 55 Units Subcutaneous Q12H NISH   insulin lispro 1-6 Units Subcutaneous HS   insulin lispro 10 Units Subcutaneous Once   insulin lispro 22 Units Subcutaneous TID With Meals   insulin lispro 4-20 Units Subcutaneous TID AC   levETIRAcetam 1,000 mg Oral Q12H [facility]   metoprolol succinate 50 mg Oral Q12H   pantoprazole 40 mg Intravenous Q24H NISH   phenytoin 100 mg Oral TID   phenytoin 100 mg Oral HS   senna 1 tablet Oral Daily   valsartan 160 mg Oral Q12H [facility]         Assessments:    1  Acute Right Parietal CVA  2  Chronic Basal Ganglia/Left Cerebellar Micro-hemorrhaging - Hypertensive Crisis on Admission   3  H/o Hypertension   4  Uncontrolled Diabetes Mellitus type 2   5  Acute Kidney Injury   6  Morbid Obesity   7  Chronic Diastolic CHF  8  CAD  9  H/o Seizures   10  Medication Noncompliance       Plan:    · Appreciate neurology evaluation/input  MRI of brain findings noted  Symptomatology is secondary to uncontrolled hypertension/diabetes mellitus on admission with long-term noncompliance to home medication regimen    Carotid dopplers revealed bilateral stenosis of <50%  Lipid panel noted  Continue ASA/Plavix - API/P2Y12 markers are within normal limits indicative of noncompliance to ASA/Plavix regimen  Decreased serum Dilantin level despite aggressive home regimen also indicates noncompliance  Strongly counseled on blood sugar/BP control via medication compliance which patient seems to acknowledge now  Started on Lipitor yesterday   · Continue home Clonidine/Toprol-XL/Norvasc regimen - started on Valsartan today by cardiology - low sodium diet encouraged  · HgA1c uncontrolled @ 11 2 - stopped home oral hypoglycemics as patient will need to be on an insulin regimen from this point onwards - on basal/prandial insulin regimen with additional PRN SSI coverage per accuchecks - serum acetone level normal on 12/10 - endocrinology following - patient will need close outpatient monitoring  · Serum creatinine continues improved with cessation of Lisinopril - Valsartan started today by cardiology hence will continue to monitor renal function and avoid nephrotoxins if possible  · BMI of 41 23 noted  Lifestyle/diet modifications heavily encouraged - seems unlikely to comply however  · EF of 70% noted - continue Toprol-Xl  · As mentioned above, encouraged compliance to ASA/Plavix regimen - started on Lipitor now  · Continue Keppra/Dilantin ER regimen - subtherapeutic Dilantin level noted - EEG negative for acute epileptiform waves  · PT/OT recommends inpatient rehab - family to decide on facility choices  VTE Prophylaxis:  Lovenox        Time Spent for Care: 33 minutes  More than 50% of total time spent on counseling and coordination of care as described above  Current Length of Stay: 3 day(s)      Code Status: Level 1 - Full Code       Today, Patient Was Seen By: Denise Lau MD    ** Please Note: This note has been constructed using a voice recognition system   **

## 2017-12-13 NOTE — OCCUPATIONAL THERAPY NOTE
[address] Evaluation     Patient Name: Danni Cooper  LMGSM'W Date: 12/13/2017  Problem List  Patient Active Problem List   Diagnosis    Toxic metabolic encephalopathy    Cardiac disease    COPD [facility]chronic obstructive pulmonary disease) ([facility] )    CH[facility]congestive heart failure) ([facility])    [facility] ([facility]    Hypertension    Seizures ([facility]    Hypertensive urgency    Acute kidney injury ([facility]     Past Medical History  Past Medical History:   Diagnosis Date    Cardiac disease     [facility]estive [facility] failure) [facility]     COPD [facility]chronic obstructive pulmonary disease) [facility]     Diabetes mellitus ([facility]     Hypertension     Seizures ([facility] )      Past Surgical History  Past Surgical History:   Procedure Laterality Date    CARDIAC CATHETERIZATION  12/2017    R groin access, no intervention    CHOLECYSTECTOMY           12/13/17 0915   Note Type   Note type Eval/Treat   Restrictions/Precautions   Weight Bearing Precautions Per Order No   Other Precautions Fall Risk;Multiple lines;Telemetry   Pain Assessment   Pain Assessment No/denies pain   Pain Score No Pain   Home Living   Type of Home Apartment   Home Layout One level;Stairs to enter with rails   Bathroom Shower/Tub Tub/shower unit   Bathroom Toilet Standard   Bathroom Accessibility Accessible   Additional Comments Pt reports lives with her granddaughter in an apartment w/ 4+3 SANDRA  Prior Function   Level of [place] Independent with ADLs and functional mobility   Lives With Family  (Granddaughter)   Receives Help From Family   ADL Assistance Needs assistance   IADLs Needs assistance   Falls in the last 6 months 0   Vocational Unemployed   Comments Pt reports some A w/  ADLS and IADLS PTA  Pt reports she required no use of DME PTA   Lifestyle   Autonomy Pt reports she required some A for ADLS and A for IADLS PTA  Pt reports granddaugther assists w/ ADLS/IADLS and her adult day care assists w/ medidcation management      Reciprocal Relationships Pt reports she lives with her granddaughter  However, per CM note, pt lives with SO in apartment w/ elevator access  Service to Others Pt reports she is unemployed    Intrinsic Gratification Pt enjoys going to the [employer] and attends an adult day care M-F for 5 hours a day   Psychosocial   Psychosocial (WDL) WDL   ADL   Eating Assistance 5  Supervision/Setup   Eating Deficit Supervision/safety   Grooming Assistance 5  Supervision/Setup   Grooming Deficit Supervision/safety; Increased time to complete;Verbal cueing;Standing with assistive device;Steadying   UB Bathing Assistance 4  Minimal Assistance   UB Bathing Deficit Verbal cueing;Supervision/safety; Increased time to complete;Steadying   LB Bathing Assistance 3  Moderate Assistance   LB Bathing Deficit Supervision/safety; Increased time to complete;Verbal cueing;Steadying;Right lower leg including foot; Left lower leg including foot; Buttocks   UB Dressing Assistance 4  Minimal Assistance   UB Dressing Deficit Steadying;Supervision/safety; Increased time to complete;Verbal cueing   LB Dressing Assistance 2  Maximal Assistance   LB Dressing Deficit Steadying;Verbal cueing;Supervision/safety; Increased time to complete; Don/doff R sock; Don/doff L sock; Thread RLE into pants; Thread LLE into pants; Thread RLE into underwear; Thread LLE into underwear;Don/doff R shoe;Don/doff L shoe   Toileting Assistance  4  Minimal Assistance   Toileting Deficit Steadying;Supervison/safety;Verbal cueing; Increased time to complete; Bedside commode   Bed Mobility   Additional Comments Unable to assess, pt seated EOB upon  arrival and OOB to chair at end of therapy sesison    Transfers   Sit to Stand 4  Minimal assistance   Additional items Assist x 1; Increased time required;Verbal cues   Stand to Sit 4  Minimal assistance   Additional items Assist x 1; Increased time required;Verbal cues   Toilet transfer 5  Supervision   Additional items Assist x 1; Increased time required;Verbal cues;Commode   Additional Comments Pt performs functional transfers w/ min A for steadying/balance and verbal cues for safe hand placement    Functional Mobility   Functional Mobility 4  Minimal assistance   Additional Comments Pt performed functional mobility using rw w/ min A for steadying/balance    Additional items Rolling walker   Balance   Static Sitting Good   Static Standing Fair +   Dynamic Standing Fair -   Ambulatory Poor +   Activity Tolerance   Activity Tolerance Patient tolerated treatment well   Nurse Made Aware FREDERIC Mackey Given confirm pt appropriate for OT eval and updated post session    RUE Assessment   RUE Assessment WFL   RUE Strength   RUE Overall Strength Within Functional Limits - able to perform ADL tasks with strength   LUE Assessment   LUE Assessment WFL   LUE Strength   LUE Overall Strength Within Functional Limits - able to perform ADL tasks with strength   Hand Function   Gross Motor Coordination Functional   Fine Motor Coordination Functional   Sensation   Light Touch Partial deficits in the LUE;Partial deficits in the RLE   Sharp/Dull Partial deficits in the LUE;Partial deficits in the RLE   Proprioception   Proprioception No apparent deficits  (glasses no present during assessment)   Vision-Basic Assessment   Current Vision Wears glasses only for reading   Visual History Cataracts  (B/L eyes pending tx)   Patient Visual Report Blurring of print when reading   Vision - Complex Assessment   Ocular Range of Motion WFL   Head Position WDL   Tracking Able to track stimulus in all quads without difficulty   Acuity Able to read employee name badge without difficulty  (difficutly w/ clock reading )   Visual Screen Results Decreased visual acuity  (Glasses not present during assessment)   Additional Comments Vision appears to be [facility]   Perception   Inattention/Neglect Appears intact   Cognition   Arousal/Participation Alert; Responsive; Cooperative   Attention Attends with cues to redirect   Orientation Level Oriented X4   Memory Within functional limits   Following Commands Follows one step commands without difficulty   Comments Pt is alert and oriented x4  Pt pleasant and cooperative  Pt mildly distracted however attends w/ increased time and minimal cues  Pt able to perform delayed recall for 2/3 words provided by therapist  Per chart review, pt with history of medication/medical noncompliance  Per chart review, pt lives w/ significant other in an apartment w/ elevator, however pt reported to therapist she lives with her granddaughter in an apartment w/ stairs and no elevator  Recomemnd formal cognitive assessment  Assessment   Limitation Decreased ADL status; Decreased endurance;Decreased Safe judgement during ADL;Decreased cognition;Decreased self-care trans;Decreased high-level ADLs   Prognosis Fair   Assessment Pt is a 58 y/o female seen for OT eval s/p adm to [facility] w/ altered mental status  In the ER pt presented w/ hypertensive urgency and elevated blood sugars  Pt dx'd w/ toxic metabolic encephalopathy, and ROSELIA  MRI revealed acute ischemic stroke  Pt may have had seizure PTA, EEG performed  Comorbidities include a h/o CHF, cardiac disease, COPD, DM, HTN, and seizures  Per chart review pt is non-compliant w/ medications  Pt with active OT orders and up with assistance orders  Pt reports she lives with her granddaughter in an apartment w/ 4+3 SANDRA  However, per CM note, pt lives with SO in apartment w/ elevator access  Pt reports some A w/  ADLS and IADLS PTA  Pt reports she required no use of DME PTA  Pt is currently demonstrating the following occupational deficits: min A UB ADLS, max A LB ADLS, min A functional transfers and min A functional mobility using rw   Pt with deficits and limitations in all baseline areas of occupation 2* decreased endurance/activity tolerance, impaired balance, decreased transfer status, decreased mobility status, impaired sensation, fatigue, decreased cognition, decreased safety awareness and impaired judgement, multiple lines and continuous BP/HR/SPO2 monitoring  The following Occupational Performance Areas to address include: bathing/shower, toilet hygiene, dressing, medication management, functional mobility, community mobility and clothing management  Pt scored overall 55/100 on the Barthel Index and a 4 on the Modified Van Buren Scale  Based on the aforementioned OT evaluation, functional performance deficits, and assessments, pt has been identified as a high complexity evaluation  Recommend STR upon D/C  Pt to continue to benefit from acute immediate OT services to address the following goals 3-5x/wk to  w/in 7-10 days:   Goals   Patient Goals to go home    Plan   Treatment Interventions ADL retraining;Functional transfer training; Endurance training;Cognitive reorientation;Patient/family training;Equipment evaluation/education; Compensatory technique education;Continued evaluation; Energy conservation; Activityengagement   Goal Expiration Date 17   OT Frequency 3-5x/wk   Recommendation   OT Discharge Recommendation Short Term Rehab   OT - OK to Discharge Yes  (to STR when medically stable)   Barthel Index   Feeding 10   Bathing 0   Grooming Score 5   Dressing Score 5   Bladder Score 10   Bowels Score 10   Toilet Use Score 5   Transfers (Bed/Chair) Score 10   Mobility (Level Surface) Score 0  (less than 50)   Stairs Score 0   Barthel Index Score 55   Modified Nohemi Scale   Modified Van Buren Scale 4       GOALS:  1) Pt will imrpove activity tolerance to G for min 30 min txment sessions  2) Pt will complete ADLs/self care w/ Supervision  3) Pt will complete toileting w/ mod I w/ G hygiene/thoroughness using DME PRN  4) Pt will improve fx'l tfers on/off all surfaces using dME PRN w/ G balance/safety including toileting w/ mod I  5) Pt will improve fx'l mobility during I/ADl/leisure tasks using DME PRN w/ g balance/safety w/ mod I  6) Pt will engage in ongoing cognitive assessment w/ G participation w/ mod I to A w/ safe d/c planning/recommendations  7) Pt will demonstrate G carryover of pt/caregiver education and training as appropriate w/ mod I w/o cues w/ G tolerance  8) Pt will engage in ongoing  assessments, screens, and activities t/o fx'l I/ADL/leisure tasks w/ G participation and mod I to A w/ adaptation and accomodations or rule out visual perceptual impairments  9) Pt will engage in simulated IADL tasks w/ G participation and G carryover of adaptive techniques      Joshua Dover MS, OTR/L

## 2017-12-14 LAB
ANION GAP SERPL CALCULATED.3IONS-SCNC: 5 MMOL/L (ref 4–13)
BUN SERPL-MCNC: 21 MG/DL (ref 5–25)
CALCIUM SERPL-MCNC: 9.2 MG/DL (ref 8.3–10.1)
CHLORIDE SERPL-SCNC: 107 MMOL/L (ref 100–108)
CO2 SERPL-SCNC: 25 MMOL/L (ref 21–32)
CREAT SERPL-MCNC: 1.07 MG/DL (ref 0.6–1.3)
GFR SERPL CREATININE-BSD FRML MDRD: 66 ML/MIN/1.73SQ M
GLUCOSE SERPL-MCNC: 124 MG/DL (ref 65–140)
GLUCOSE SERPL-MCNC: 134 MG/DL (ref 65–140)
GLUCOSE SERPL-MCNC: 240 MG/DL (ref 65–140)
GLUCOSE SERPL-MCNC: 58 MG/DL (ref 65–140)
GLUCOSE SERPL-MCNC: 67 MG/DL (ref 65–140)
GLUCOSE SERPL-MCNC: 99 MG/DL (ref 65–140)
GLUCOSE SERPL-MCNC: 99 MG/DL (ref 65–140)
POTASSIUM SERPL-SCNC: 4.9 MMOL/L (ref 3.5–5.3)
SODIUM SERPL-SCNC: 137 MMOL/L (ref 136–145)

## 2017-12-14 PROCEDURE — 97530 THERAPEUTIC ACTIVITIES: CPT

## 2017-12-14 PROCEDURE — 82948 REAGENT STRIP/BLOOD GLUCOSE: CPT

## 2017-12-14 PROCEDURE — 97110 THERAPEUTIC EXERCISES: CPT

## 2017-12-14 PROCEDURE — 80048 BASIC METABOLIC PNL TOTAL CA: CPT | Performed by: INTERNAL MEDICINE

## 2017-12-14 PROCEDURE — 97116 GAIT TRAINING THERAPY: CPT

## 2017-12-14 RX ORDER — VALSARTAN 80 MG/1
80 TABLET ORAL EVERY 12 HOURS SCHEDULED
Status: DISCONTINUED | OUTPATIENT
Start: 2017-12-14 | End: 2017-12-15 | Stop reason: HOSPADM

## 2017-12-14 RX ORDER — PANTOPRAZOLE SODIUM 40 MG/1
40 TABLET, DELAYED RELEASE ORAL
Status: DISCONTINUED | OUTPATIENT
Start: 2017-12-14 | End: 2017-12-15 | Stop reason: HOSPADM

## 2017-12-14 RX ORDER — BISACODYL 10 MG
10 SUPPOSITORY, RECTAL RECTAL DAILY
Status: DISCONTINUED | OUTPATIENT
Start: 2017-12-14 | End: 2017-12-15 | Stop reason: HOSPADM

## 2017-12-14 RX ADMIN — AMLODIPINE BESYLATE 5 MG: 5 TABLET ORAL at 22:37

## 2017-12-14 RX ADMIN — VALSARTAN 80 MG: 80 TABLET, FILM COATED ORAL at 09:39

## 2017-12-14 RX ADMIN — CLONIDINE HYDROCHLORIDE 0.2 MG: 0.2 TABLET ORAL at 09:38

## 2017-12-14 RX ADMIN — INSULIN GLARGINE 55 UNITS: 100 INJECTION, SOLUTION SUBCUTANEOUS at 09:42

## 2017-12-14 RX ADMIN — PANTOPRAZOLE SODIUM 40 MG: 40 TABLET, DELAYED RELEASE ORAL at 09:38

## 2017-12-14 RX ADMIN — CLONIDINE HYDROCHLORIDE 0.2 MG: 0.2 TABLET ORAL at 17:33

## 2017-12-14 RX ADMIN — PHENYTOIN SODIUM 100 MG: 100 CAPSULE ORAL at 22:01

## 2017-12-14 RX ADMIN — METOPROLOL SUCCINATE 50 MG: 50 TABLET, EXTENDED RELEASE ORAL at 09:38

## 2017-12-14 RX ADMIN — INSULIN GLARGINE 55 UNITS: 100 INJECTION, SOLUTION SUBCUTANEOUS at 22:00

## 2017-12-14 RX ADMIN — LEVETIRACETAM 1000 MG: 500 TABLET ORAL at 09:38

## 2017-12-14 RX ADMIN — SENNOSIDES 8.6 MG: 8.6 TABLET, FILM COATED ORAL at 09:38

## 2017-12-14 RX ADMIN — BISACODYL 10 MG: 10 SUPPOSITORY RECTAL at 17:36

## 2017-12-14 RX ADMIN — DOCUSATE SODIUM 100 MG: 100 CAPSULE, LIQUID FILLED ORAL at 09:38

## 2017-12-14 RX ADMIN — PHENYTOIN SODIUM 100 MG: 100 CAPSULE ORAL at 09:39

## 2017-12-14 RX ADMIN — CLOPIDOGREL BISULFATE 75 MG: 75 TABLET ORAL at 09:38

## 2017-12-14 RX ADMIN — ENOXAPARIN SODIUM 40 MG: 40 INJECTION SUBCUTANEOUS at 09:37

## 2017-12-14 RX ADMIN — AMLODIPINE BESYLATE 5 MG: 5 TABLET ORAL at 09:38

## 2017-12-14 RX ADMIN — METOPROLOL SUCCINATE 50 MG: 50 TABLET, EXTENDED RELEASE ORAL at 22:00

## 2017-12-14 RX ADMIN — ASPIRIN 81 MG 81 MG: 81 TABLET ORAL at 09:38

## 2017-12-14 RX ADMIN — VALSARTAN 80 MG: 80 TABLET, FILM COATED ORAL at 22:00

## 2017-12-14 RX ADMIN — ATORVASTATIN CALCIUM 40 MG: 40 TABLET, FILM COATED ORAL at 17:33

## 2017-12-14 RX ADMIN — PHENYTOIN SODIUM 100 MG: 100 CAPSULE ORAL at 17:33

## 2017-12-14 RX ADMIN — LEVETIRACETAM 1000 MG: 500 TABLET ORAL at 22:00

## 2017-12-14 RX ADMIN — INSULIN LISPRO 8 UNITS: 100 INJECTION, SOLUTION INTRAVENOUS; SUBCUTANEOUS at 12:23

## 2017-12-14 RX ADMIN — DOCUSATE SODIUM 100 MG: 100 CAPSULE, LIQUID FILLED ORAL at 17:33

## 2017-12-14 NOTE — PROGRESS NOTES
The pantoprazole has / have been converted to Oral per [facility] IV-to-PO Auto-Conversion Protocol for Adults as approved by the Pharmacy and Therapeutics Committee  The patient met all eligible criteria:  3 Age = 23years old   2) Received at least one dose of the IV form   3) Receiving at least one other scheduled oral/enteral medication   4) Tolerating an oral/enteral diet   and did not have any exclusions:   1) Critical care patient   2) Active GI bleed (IF assessing H2RAs or PPIs)   3) Continuous tube feeding (IF assessing cipro, doxycycline, levofloxacin, minocycline, rifampin, or voriconazole)   4) Receiving PO vancomycin (IF assessing metronidazole)   5) Persistent nausea and/or vomiting   6) Ileus or gastrointestinal obstruction   7) Paco/nasogastric tube set for continuous suction   8) Specific order not to automatically convert to PO (in the order's comments or if discussed in the most recent Infectious Disease or primary team's progress notes)

## 2017-12-14 NOTE — PROGRESS NOTES
Cardiology Progress Note - Esther Arboleda 59 y o  female MRN: [id number]    Unit/Bed#: [facility] 531-01 Encounter: [id number]      Assessment:  Principal Problem:    Hypertensive urgency  Active Problems:    Toxic metabolic encephalopathy    Cardiac disease    COPD (chronic obstructive pulmonary disease) (HCC)    CHF (congestive heart failure) (HCC)    Diabetes mellitus (HCC)    Seizures ([facility] )    Acute kidney injury ([facility] )      Plan:  Patient is comfortable  She has no chest pain or significant dyspnea  She is in sinus rhythm on telemetry  Her blood pressure is improved on her current antihypertensive regimen  Her potassium today is 4 9 and I will reduce the dose of her valsartan to 80 twice a day  I will recheck a BMP in the morning  Subjective:   Patient seen and examined  No significant events overnight   negative  Objective:     Vitals: Blood pressure 136/68, pulse 67, temperature 98 1 °F (36 7 °C), temperature source Oral, resp  rate 20, height 5' 4" (1 626 m), weight 109 kg (240 lb 3 2 oz), SpO2 99 %  , Body mass index is 41 23 kg/m² , Orthostatic Blood Pressures    Flowsheet Row Most Recent Value   Blood Pressure  136/68 filed at 12/14/2017 0317   Patient Position - Orthostatic VS  Lying filed at 12/13/2017 1846      ,      Intake/Output Summary (Last 24 hours) at 12/14/17 0733  Last data filed at 12/14/17 0320   Gross per 24 hour   Intake              371 ml   Output             1150 ml   Net             -779 ml       No significant arrhythmias seen on telemetry review         Physical Exam:    GEN: Esther Arboleda appears well, alert and oriented x 3, pleasant and cooperative   NECK: supple, no carotid bruits, no JVD or HJR  HEART: normal rate, regular rhythm, normal S1 and S2, no murmurs, clicks, gallops or rubs   LUNGS: clear to auscultation bilaterally; no wheezes, rales, or rhonchi   ABDOMEN: normal bowel sounds, soft, no tenderness, no distention  EXTREMITIES: peripheral pulses normal; no clubbing, cyanosis, or edema  SKIN: warm and well perfused, no suspicious lesions on exposed skin    Labs & Results:    Admission on 12/10/2017   No results displayed because visit has over 200 results  Xr Chest 2 Views    Result Date: 12/11/2017  Narrative: CHEST INDICATION:  Chest pain, shortness of breath and vomiting  COMPARISON:  None VIEWS:  Frontal and lateral projections IMAGES:  2 FINDINGS:     The heart is enlarged  The pulmonary vessels appear normal  The lungs and pleural spaces are clear  Visualized osseous structures appear within normal limits for the patient's age  Impression: No acute abnormality in the chest  Workstation performed: [id number]     Ct Head Without Contrast    Result Date: 12/10/2017  Narrative: CT BRAIN - WITHOUT CONTRAST INDICATION:  Headache  Dizziness  Hypertension  COMPARISON:  None  TECHNIQUE:  CT examination of the brain was performed  In addition to axial images, coronal reformatted images were created and submitted for interpretation  Radiation dose length product (DLP) for this visit:  993 mGy-cm   This examination, like all CT scans performed in the [facility], was performed utilizing techniques to minimize radiation dose exposure, including the use of iterative reconstruction and automated exposure control  IMAGE QUALITY:  Diagnostic  FINDINGS:  PARENCHYMA:  Decreased attenuation is noted in the supratentorial white matter demonstrating an appearance most consistent with mild microangiopathic change  No intracranial mass, mass effect or midline shift  No CT signs of acute infarction  There is no parenchymal hemorrhage  VENTRICLES AND EXTRA-AXIAL SPACES:  Normal for patient's age  VISUALIZED ORBITS AND PARANASAL SINUSES:  Unremarkable  CALVARIUM AND EXTRACRANIAL SOFT TISSUES:   Normal      Impression: No acute intracranial abnormality   Workstation performed: [id number]     Mri Brain Wo Contrast    Result Date: 12/12/2017  Narrative: MRI BRAIN WITHOUT CONTRAST INDICATION:     COMPARISON:   None  TECHNIQUE:  Sagittal T1, axial T2, axial FLAIR, axial T1, axial [place] and axial diffusion imaging  IMAGE QUALITY:  Diagnostic  FINDINGS: BRAIN PARENCHYMA:  There is a punctate focus of restricted diffusion in the right parietal corona radiata and periventricular white matter consistent with an acute lacunar infarct  T-2/FLAIR hyperintense foci in the periventricular and subcortical white matter likely represent microangiopathic disease  Punctate foci of susceptibility artifact in the left cerebellar hemisphere right thalamus and bilateral lentiform represent chronic microhemorrhage  No evidence of mass or mass effect  VENTRICLES:  The ventricles are normal in size and contour  SELLA AND PITUITARY GLAND:  Normal  ORBITS:  Normal  PARANASAL SINUSES:  Minimal mucosal thickening in maxillary sinuses without significant paranasal sinus disease  VASCULATURE:  Evaluation of the major intracranial vasculature demonstrates appropriate flow voids  CALVARIUM AND SKULL BASE:  Normal  EXTRACRANIAL SOFT TISSUES:  Normal      Impression: 1  Acute lacunar infarct in the right parietal corona radiata and periventricular white matter  2  Foci of chronic microhemorrhage in the basal ganglia and left cerebellar hemisphere suggestive of sequela of chronic hypertension  No acute hemorrhage or mass effect  3  Periventricular and subcortical white matter lesions, likely to represent microangiopathic disease  Workstation performed: [id number]     Vas Carotid Complete Study    Result Date: 12/12/2017  Narrative:  [facility] REPORT CLINICAL: Indications: Patient presents with altered mental status and CVA  Physician wants to rule out carotid artery stenosis  Operative History Cardiac catheter Cholecystectomy Risk Factors The patient has history of obesity, HTN, Diabetes, CHF, seizure disorder, pulmonary status, and renal disease   The patient's current BMI is 41 19, Weight (lb) is 240 lb and Height (in) is 64 in  She is a non-smoker  Clinical Right Brachial Pressure:  162/79 mmHg, Left:  IV site  FINDINGS:  Right        Impression  PSV  EDV (cm/s)  Direction of Flow  Ratio  Dist  ICA                 65          21                      0 60  Mid  ICA                  58          19                      0 53  Prox  ICA    1 - 49%      69          15                      0 63  Dist CCA                  76          11                            Mid CCA                  109          18                      1 00  Prox CCA                 109          16                            Ext Carotid              159          17                      1 46  Prox Vert                 60          11  Antegrade                 Subclavian               106          12                             Left         Impression  PSV  EDV (cm/s)  Direction of Flow  Ratio  Dist  ICA                 75          19                      0 67  Mid  ICA                  74          14                      0 66  Prox  ICA    1 - 49%      85          20                      0 75  Dist CCA                  85          16                            Mid CCA                  112          17                      0 90  Prox CCA                 124          19                            Ext Carotid              119          10                      1 06  Prox Vert                 83          16  Antegrade                 Subclavian               103           7                               CONCLUSION:  Impression  RIGHT: There is <50% stenosis noted in the internal carotid artery  Plaque is heterogenous and smooth  Vertebral artery flow is antegrade  There is no significant subclavian artery disease  LEFT: There is <50% stenosis noted in the internal carotid artery  Plaque is heterogenous and smooth  Vertebral artery flow is antegrade  There is no significant subclavian artery disease  TECH NOTE: There is minimal plaque identified in the B/L ICA  Technically difficult bedside study  Internal carotid artery stenosis determination by consensus criteria from: Jose Stephens et al  Carotid Artery Stenosis: Gray-Scale and Doppler US Diagnosis - Society of Radiologists in [address] [place] [address], Radiology 2003; [phone]  SIGNATURE: Electronically Signed by: Ayana Henry MD on 2017-12-12 05:11:17 PM      EKG personally reviewed by Tanner Saunders MD      Counseling / Coordination of Care  Total floor / unit time spent today 30 minutes  Greater than 50% of total time was spent with the patient and / or family counseling and / or coordination of care

## 2017-12-14 NOTE — PROGRESS NOTES
Reviewed blood glucose logs from past 24 hours  Patient did not have hypoglycemia  Hyperglycemia at bedtime last night and today at lunch due to held prandial insulin  I suggest giving prandial insulin as ordered  If concern of low PO intake, etc can give half dose of Humalog but would not hold it entirely      Recent Results (from the past 24 hour(s))   Fingerstick Glucose (POCT)    Collection Time: 12/13/17  4:20 PM   Result Value Ref Range    POC Glucose 90 65 - 140 mg/dl   Fingerstick Glucose (POCT)    Collection Time: 12/13/17  9:29 PM   Result Value Ref Range    POC Glucose 207 (H) 65 - 140 mg/dl   Basic metabolic panel    Collection Time: 12/14/17  5:38 AM   Result Value Ref Range    Sodium 137 136 - 145 mmol/L    Potassium 4 9 3 5 - 5 3 mmol/L    Chloride 107 100 - 108 mmol/L    CO2 25 21 - 32 mmol/L    Anion Gap 5 4 - 13 mmol/L    BUN 21 5 - 25 mg/dL    Creatinine 1 07 0 60 - 1 30 mg/dL    Glucose 134 65 - 140 mg/dL    Calcium 9 2 8 3 - 10 1 mg/dL    eGFR 66 ml/min/1 73sq m   Fingerstick Glucose (POCT)    Collection Time: 12/14/17  6:44 AM   Result Value Ref Range    POC Glucose 124 65 - 140 mg/dl   Fingerstick Glucose (POCT)    Collection Time: 12/14/17 10:42 AM   Result Value Ref Range    POC Glucose 240 (H) 65 - 140 mg/dl

## 2017-12-14 NOTE — PHYSICAL THERAPY NOTE
PHYSICAL THERAPY NOTE          Patient Name: Daniel Siu  ZHTLE'X Date: 12/14/2017 12/14/17 1430   Pain Assessment   Pain Assessment No/denies pain   Restrictions/Precautions   Other Precautions Fall Risk  (bed alarm activated at the end of session)   General   Chart Reviewed Yes   Additional Pertinent History cleared for Tx session (spoke to nsjanis)   Response to Previous Treatment Patient with no complaints from previous session  Family/Caregiver Present Yes  (son)   Cognition   Overall Cognitive Status WFL   Arousal/Participation Responsive   Attention Within functional limits   Orientation Level Oriented to person;Oriented to place;Oriented to situation   Memory Within functional limits   Following Commands Follows one step commands without difficulty   Subjective   Subjective Pt is observed in bed; agreeable to mobilize;   Bed Mobility   Supine to Sit 3  Moderate assistance   Additional items Assist x 1;HOB elevated; Increased time required;Verbal cues   Sit to Supine 3  Moderate assistance   Additional items Assist x 1; Increased time required;Verbal cues;LE management   Transfers   Sit to Stand 4  Minimal assistance   Additional items Assist x 1;Verbal cues  (3 trials)   Stand to Sit 4  Minimal assistance   Additional items Assist x 1;Verbal cues  (3 trials)   Ambulation/Elevation   Gait pattern Excessively slow; Short stride; Inconsistent bebeto   Gait Assistance 4  Minimal assist   Additional items Assist x 1;Verbal cues; Tactile cues  (chair follow)   Assistive Device Rolling walker   Distance 30 ft + 90 ft +30 ft w/ sitted rest periods in between   Balance   Static Sitting Fair +  (transient dizziness reported)   Static Standing Fair -   Ambulatory Poor +   Activity Tolerance   Activity Tolerance Patient limited by fatigue   Nurse Made Aware spoke to Yolanda [address] [place] [address]   Exercises   Hip Abduction Supine;10 reps;AAROM; Bilateral  (2 sets)   Knee AROM Short Arc Quad Supine;10 reps;AAROM; Bilateral  (2 sets)   Ankle Pumps Supine;10 reps;AAROM;AROM; Bilateral  (2 sets)   Equipment Use   CPM adjusted Right Speed pillow placed for LE support at the end of session; call bell w/in reach   Assessment   Prognosis Good   Problem List Decreased strength;Decreased endurance; Impaired balance;Decreased mobility;Obesity   Assessment Pt demonstrated improvement in functional mobility skills since the initial eval progressing w/ amb distance using rw; pt still requires (A)x1 w/ all aspects of mobility and exhibits general weakness and deconditioning, balance impairment, gait dysfunction, and decreased tolerance to mobilization --> frequent rest periods provided t/o the session; pt remained in NAD and appeared to be comfortable post session; currently, cont to recommend rehab upon D/C when medically cleared; will follow  Barriers to Discharge Decreased caregiver support   Goals   Patient Goals none expressed   STG Expiration Date 12/22/17   Treatment Day 1   Plan   Treatment/Interventions Functional transfer training;LE strengthening/ROM; Therapeutic exercise; Endurance training;Bed mobility;Gait training;Spoke to nursing;OT   Progress Progressing toward goals   PT Frequency 5x/wk   Recommendation   Recommendation Post acute IP rehab   Equipment Recommended Jada Bowling

## 2017-12-14 NOTE — PROGRESS NOTES
[facility] - Internal Medicine Progress Note  Patient: Keisha Kate 59 y o  female   MRN: [id number]  PCP: No primary care provider on file  [facility] Encounter: [id number]  Date Of Visit: 17         Subjective:   No new complaints  States she feels less weak today  Denies headache at this time  Blood pressures have generally improved  A[place]ing out[place]of-state placement anticipating discharge tomorrow  Objective:     Vitals:   Temp (24hrs), Av 3 °F (36 8 °C), Min:98 °F (36 7 °C), Max:98 6 °F (37 °C)    HR:  [60-69] 60  Resp:  [16-20] 18  BP: (118-157)/(56-88) 118/58  SpO2:  [97 %-99 %] 98 %  Body mass index is 41 23 kg/m²  Input and Output Summary (last 24 hours):        Intake/Output Summary (Last 24 hours) at 17 1436  Last data filed at 17 1214   Gross per 24 hour   Intake              791 ml   Output             1550 ml   Net             -759 ml       Physical Exam:     GENERAL:  Obese - no acute distress  HEAD:  Normocephalic - atraumatic  EYES: PERRL - EOMI   MOUTH:  Mucosa moist  NECK:  Supple - full range of motion  CARDIAC:  Regular rate/rhythm - S1/S2 positive  PULMONARY:  Clear breath sounds bilaterally - nonlabored respirations  ABDOMEN:  Soft - nontender/nondistended - active bowel sounds  MUSCULOSKELETAL:  Motor strength/range of motion remains deconditioned  NEUROLOGIC:  Alert/oriented x 3 - no nystagmus - no current deficits noted  SKIN:  Age-appropriate wrinkles/blemishes   PSYCHIATRIC:  Mood/affect stable      Additional Data:    Labs & Recent Cultures:       Results from last 7 days  Lab Units 17  0437  12/10/17  1730   WBC Thousand/uL 9 91  --  8 59   HEMOGLOBIN g/dL 11 2*  --  12 2   I STAT HEMOGLOBIN   --   < >  --    HEMATOCRIT % 35 0  --  39 1   PLATELETS Thousands/uL 222  < > 251   NEUTROS PCT %  --   --  60   LYMPHS PCT %  --   --  33   MONOS PCT %  --   --  5   EOS PCT %  --   --  2   < > = values in this interval not displayed  Results from last 7 days  Lab Units 12/14/17  0538  12/11/17  0437   SODIUM mmol/L 137  < > 136   POTASSIUM mmol/L 4 9  < > 3 8   CHLORIDE mmol/L 107  < > 100   CO2 mmol/L 25  < > 27   BUN mg/dL 21  < > 23   CREATININE mg/dL 1 07  < > 1 45*   CALCIUM mg/dL 9 2  < > 9 3   TOTAL PROTEIN g/dL  --   --  6 9   BILIRUBIN TOTAL mg/dL  --   --  0 27   ALK PHOS U/L  --   --  109   ALT U/L  --   --  18   AST U/L  --   --  11   GLUCOSE RANDOM mg/dL 134  < > 227*   < > = values in this interval not displayed  Results from last 7 days  Lab Units 12/11/17  0455   INR  1 11                 Last 24 Hours Medication List:     amLODIPine 5 mg Oral Q12H   aspirin 81 mg Oral Daily   atorvastatin 40 mg Oral Daily With Dinner   bisacodyl 10 mg Rectal Daily   cloNIDine 0 2 mg Oral BID   clopidogrel 75 mg Oral Daily   docusate sodium 100 mg Oral BID   enoxaparin 40 mg Subcutaneous Daily   insulin glargine 55 Units Subcutaneous Q12H NISH   insulin lispro 1-6 Units Subcutaneous HS   insulin lispro 10 Units Subcutaneous Once   insulin lispro 22 Units Subcutaneous TID With Meals   insulin lispro 4-20 Units Subcutaneous TID AC   levETIRAcetam 1,000 mg Oral Q12H NISH   metoprolol succinate 50 mg Oral Q12H   pantoprazole 40 mg Oral Early Morning   phenytoin 100 mg Oral TID   phenytoin 100 mg Oral HS   senna 1 tablet Oral Daily   valsartan 80 mg Oral Q12H [address]         Assessments:    1  Acute Right Parietal CVA  2  Chronic Basal Ganglia/Left Cerebellar Micro-hemorrhaging - Hypertensive Crisis on Admission   3  H/o Hypertension   4  Uncontrolled Diabetes Mellitus type 2   5  Acute Kidney Injury   6  Morbid Obesity   7  Chronic Diastolic CHF  8  CAD  9  H/o Seizures   10  Medication Noncompliance       Plan:    · Appreciate neurology evaluation/input  MRI of brain findings noted   Symptomatology is secondary to uncontrolled hypertension/diabetes mellitus on admission with long-term noncompliance to home medication regimen  Carotid dopplers revealed bilateral stenosis of <50%  Lipid panel noted  Continue ASA/Plavix - API/P2Y12 markers were within normal limits indicative of noncompliance to ASA/Plavix regimen  Decreased serum Dilantin level despite aggressive home regimen also indicates noncompliance  Strongly counseled on blood sugar/BP control via medication compliance which patient seems to acknowledge now  Started on Lipitor during this hospitalization  · Continue home Clonidine/Toprol-XL/Norvasc regimen - started on Valsartan yesterday by cardiology with dose decreased to 80 mg BID today due to mild increase in serum potassium level - continued to encourage low sodium diet  · HgA1c uncontrolled @ 11 2 - stopped home oral hypoglycemics as patient will need to be on an insulin regimen from this point onwards - on basal/prandial insulin regimen with additional PRN SSI coverage per accuchecks - serum acetone level normal on 12/10 - endocrinology relationship established - patient will need close outpatient monitoring  · Serum creatinine now normalized status post discontinuation of Lisinopril, however Valsartan was started yesterday hence will continue to monitor renal function  · BMI of 41 23 noted  Lifestyle/diet modifications heavily encouraged - seems unlikely to comply however  · EF of 70% noted - continue Toprol-XL  · As mentioned above, encouraged compliance to ASA/Plavix regimen - started on Lipitor now  · Continue Keppra/Dilantin ER regimen - subtherapeutic Dilantin level noted - EEG negative for acute epileptiform waves  · PT/OT recommends inpatient rehab - authorization referrals made to facilities in [place] per family request         VTE Prophylaxis:  Lovenox        Time Spent for Care: 34 minutes  More than 50% of total time spent on counseling and coordination of care as described above        Current Length of Stay: 4 day(s)      Code Status: Level 1 - Full Code       Today, Patient Was Seen By: Logan Celis MD    ** Please Note: This note has been constructed using a voice recognition system   **

## 2017-12-14 NOTE — PROGRESS NOTES
Bedside rounds performed with Dr Sue Crigler  Patient to be discharged tomorrow    Check MAR for order changes

## 2017-12-14 NOTE — RESTORATIVE TECHNICIAN NOTE
Restorative Specialist Mobility Note       Activity: Ambulate in clark, Chair (chair follow)     Assistive Device: Front wheel walker     Ambulation Response: Tolerated fairly well  Repositioned: Turns self                 Assisted PT during session  For all/additional information please see PT note

## 2017-12-14 NOTE — CASE MANAGEMENT
Continued Stay Review    Date:  12/14/17 Thursday ACUTE MED SURG LEVEL OF CARE    Vital Signs: /58   Pulse 60   Temp 98 °F (36 7 °C) (Oral)   Resp 18   Ht 5' 4" (1 626 m)   Wt 109 kg (240 lb 3 2 oz)   SpO2 98%   BMI 41 23 kg/m²         Vitals: Blood pressure 136/68, pulse 67, temperature 98 1 °F (36 7 °C), temperature source Oral, resp  rate 20, height 5' 4" (1 626 m), weight 109 kg (240 lb 3 2 oz), SpO2 99 %  , Body mass index is 41 23 kg/m² , Orthostatic Blood Pressures    Flowsheet Row Most Recent Value   Blood Pressure  136/68 filed at 12/14/2017 6320   Patient Position - Orthostatic VS  Lying filed at 12/13/2017 1846       ,      Intake/Output Summary (Last 24 hours) at 12/14/17 0733    Gross per 24 hour   Intake              371 ml   Output             1150 ml   Net             -779 ml       Diet Donald/CHO Controlled; Consistent Carbohydrate Diet Level 2 (5 carb servings/75 grams CHO/meal);  Cardiac Step 1       IV ACCESS      Medications:   Scheduled Meds:   amLODIPine 5 mg Oral Q12H   aspirin 81 mg Oral Daily   atorvastatin 40 mg Oral Daily With Dinner   bisacodyl 10 mg Rectal Daily   cloNIDine 0 2 mg Oral BID   clopidogrel 75 mg Oral Daily   docusate sodium 100 mg Oral BID   enoxaparin 40 mg Subcutaneous Daily   insulin glargine 55 Units Subcutaneous Q12H NISH   insulin lispro 1-6 Units Subcutaneous HS   insulin lispro 10 Units Subcutaneous Once   insulin lispro 22 Units Subcutaneous TID With Meals   insulin lispro 4-20 Units Subcutaneous TID AC   levETIRAcetam 1,000 mg Oral Q12H NISH   metoprolol succinate 50 mg Oral Q12H   pantoprazole 40 mg Oral Early Morning   phenytoin 100 mg Oral TID   phenytoin 100 mg Oral HS   senna 1 tablet Oral Daily   valsartan 80 mg Oral Q12H [address]       PRN Meds:     acetaminophen    aluminum-magnesium hydroxide-simethicone    calcium carbonate    enalaprilat    Ondansetron      LABS/Diagnostic Results:   CBC  Results from last 7 days  Lab Units 12/11/17  8985   12/10/17  1730   WBC Thousand/uL 9 91  --  8 59   HEMOGLOBIN g/dL 11 2*  --  12 2   I STAT HEMOGLOBIN    --   < >  --    HEMATOCRIT % 35 0  --  39 1   PLATELETS Thousands/uL 222  < > 251   NEUTROS PCT %  --   --  60   LYMPHS PCT %  --   --  33   MONOS PCT %  --   --  5   EOS PCT %  --   --  2      CMP  Results from last 7 days  Lab Units 12/12/17  1209 12/11/17  0437   SODIUM mmol/L 136 136   POTASSIUM mmol/L 3 8 3 8   CHLORIDE mmol/L 102 100   CO2 mmol/L 27 27   BUN mg/dL 23 23   CREATININE mg/dL 1 23 1 45*   CALCIUM mg/dL 9 0 9 3   TOTAL PROTEIN g/dL  --  6 9   BILIRUBIN TOTAL mg/dL  --  0 27   ALK PHOS U/L  --  109   ALT U/L  --  18   AST U/L  --  11   GLUCOSE RANDOM mg/dL 294* 227*       Results from last 7 days  Lab Units 12/11/17  0455   INR   1 11       MRI BRAIN 12/12/17 -  1  Acute lacunar infarct in the right parietal corona radiata and periventricular white matter  2  Foci of chronic microhemorrhage in the basal ganglia and left cerebellar hemisphere suggestive of sequela of chronic hypertension  No acute hemorrhage or mass effect  3  Periventricular and subcortical white matter lesions, likely to represent microangiopathic disease      Age/Sex: 59 y o  female     Assessment/Plan:   Assessments:  1  Acute Right Parietal CVA  2  Chronic Basal Ganglia/Left Cerebellar Micro-hemorrhaging - Hypertensive Crisis on Admission   3  H/o Hypertension   4  Uncontrolled Diabetes Mellitus type 2   5  Acute Kidney Injury   6  Morbid Obesity   7  Chronic Diastolic CHF  8  CAD  9  H/o Seizures   10  Medication Noncompliance       Plan:   · Appreciate neurology evaluation/input  MRI of brain findings noted  Symptomatology is secondary to uncontrolled hypertension/diabetes mellitus on admission with long-term noncompliance to home medication regimen  Carotid dopplers revealed bilateral stenosis of <50%  Lipid panel noted    Continue ASA/Plavix - API/P2Y12 markers are within normal limits indicative of noncompliance to ASA/Plavix regimen  Decreased serum Dilantin level despite aggressive home regimen also indicates noncompliance  Strongly counseled on blood sugar/BP control via medication compliance which patient seems to acknowledge now  Started on Lipitor yesterday   · Continue home Clonidine/Toprol-XL/Norvasc regimen - started on Valsartan today by cardiology - low sodium diet encouraged  · HgA1c uncontrolled @ 11 2 - stopped home oral hypoglycemics as patient will need to be on an insulin regimen from this point onwards - on basal/prandial insulin regimen with additional PRN SSI coverage per accuchecks - serum acetone level normal on 12/10 - endocrinology following - patient will need close outpatient monitoring  · Serum creatinine continues improved with cessation of Lisinopril - Valsartan started today by cardiology hence will continue to monitor renal function and avoid nephrotoxins if possible  · BMI of 41 23 noted  Lifestyle/diet modifications heavily encouraged - seems unlikely to comply however  · EF of 70% noted - continue Toprol-Xl  · As mentioned above, encouraged compliance to ASA/Plavix regimen - started on Lipitor now  · Continue Keppra/Dilantin ER regimen - subtherapeutic Dilantin level noted - EEG negative for acute epileptiform waves  · PT/OT recommends inpatient rehab - family to decide on facility choices        VTE Prophylaxis:  Lovenox           Discharge Plan:   ANTICIPATE DISCHARGE TO SHORT TERM INPATIENT REHAB  WHEN MEDICALLY CLEARED    PER PT   Plan   Treatment/Interventions Functional transfer training;LE strengthening/ROM; Therapeutic exercise; Endurance training;Bed mobility;Gait training;Spoke to nursing;Spoke to case management   PT Frequency 5x/wk   Recommendation   Recommendation Post acute IP rehab     PER OT  Plan   Treatment Interventions ADL retraining;Functional transfer training; Endurance training;Cognitive reorientation;Patient/family training;Equipment evaluation/education; Compensatory technique education;Continued evaluation; Energy conservation; Activityengagement   Goal Expiration Date 12/23/17   OT Frequency 3-5x/wk   Recommendation   OT Discharge Recommendation Short Term Rehab       CASE MANAGEMENT FOLLOWING CLOSELY FOR ALL DISCHARGE NEEDS

## 2017-12-14 NOTE — SOCIAL WORK
CM met with Pt and son to discuss [facility] has accepted her for a d/c to their facility tomorrow  Both and Pt and son reported being agreeable to the d/c plan for tomorrow  Pt and son reported they would arrange their own transport for tomorrows pending d/c  CM will continue to follow

## 2017-12-14 NOTE — PLAN OF CARE
Problem: PHYSICAL THERAPY ADULT  Goal: Performs mobility at highest level of function for planned discharge setting  See evaluation for individualized goals  Treatment/Interventions: Functional transfer training, LE strengthening/ROM, Therapeutic exercise, Endurance training, Bed mobility, Gait training, Spoke to nursing, Spoke to case management  Equipment Recommended: Linard Bernheim (w/ (A))       See flowsheet documentation for full assessment, interventions and recommendations  Outcome: Progressing  Prognosis: Good  Problem List: Decreased strength, Decreased endurance, Impaired balance, Decreased mobility, Obesity  Assessment: Pt demonstrated improvement in functional mobility skills since the initial eval progressing w/ amb distance using rw; pt still requires (A)x1 w/ all aspects of mobility and exhibits general weakness and deconditioning, balance impairment, gait dysfunction, and decreased tolerance to mobilization --> frequent rest periods provided t/o the session; pt remained in NAD and appeared to be comfortable post session; currently, cont to recommend rehab upon D/C when medically cleared; will follow  Barriers to Discharge: Decreased caregiver support     Recommendation: Post acute IP rehab          See flowsheet documentation for full assessment

## 2017-12-14 NOTE — PLAN OF CARE
Problem: OCCUPATIONAL THERAPY ADULT  Goal: Performs self-care activities at highest level of function for planned discharge setting  See evaluation for individualized goals  Treatment Interventions: ADL retraining, Functional transfer training, Endurance training, Cognitive reorientation, Patient/family training, Equipment evaluation/education, Compensatory technique education, Continued evaluation, Energy conservation, Activityengagement          See flowsheet documentation for full assessment, interventions and recommendations  Limitation: Decreased ADL status, Decreased endurance, Decreased Safe judgement during ADL, Decreased cognition, Decreased self-care trans, Decreased high-level ADLs  Prognosis: Fair  Assessment: Pt  seen for OT treatment session to complete functional outcome measures to assess: UE/LE functioning, trunk control, balance, proprioception, cognition and anxious/depressive symptoms  Pt  pleasant and agreeable to participate and overall, pt  tolerated session well  Pt  Completed the following functional recovery indices to identify LT prognostic outcome potential and anxiety/depression screens: The [place] Prognostic Scale, Motricity Index and Trunk Control Test, Neuro QOL Anxiety Short From, and Neuro QOL Depression Short Form  Please see detailed scores and interpretation below  After completion of outcome measures, Pt  Was educated on the results and functional implications of the same  Additionally, it should be noted that pt  Completed bed mobility at a min A level, functional transfers at a min A level, and functional mobility with RW at a min A  level  Further ADLs were not completed during this treatment session as session was focused on above assessments  Pt  Continues to display the following deficits: decreased functional activity tolerance, generalized weakness, deconditioning, decreased standing/ambulatory balance, decreased safety awareness   Pt  will continue to benefit from OT services to address noted deficits and maximize functional gains  Continue to rec  STR s/p d c      OT Discharge Recommendation: Short Term Rehab  OT - OK to Discharge:  Yes

## 2017-12-14 NOTE — OCCUPATIONAL THERAPY NOTE
Occupational Therapy Progress Note      Patient Name: Keisha Kate    [id number]'[id number] Date: 12/14/2017    Problem List:   Patient Active Problem List   Diagnosis    Toxic metabolic encephalopathy    Cardiac disease    COPD [facility]chronic obstructive [facility] disease) [facility]    CHF (congestive heart failure) ([facility] )    Diabetes mellitus ([facility] )    Hypertension    Seizures ([facility] )    Hypertensive urgency    Acute kidney injury ([facility] )          12/14/17 1504   Restrictions/Precautions   Weight Bearing Precautions Per Order No   Other Precautions Fall Risk  (bed alarm activated)   Lifestyle   Autonomy Pt reports she required some A for ADLS and A for IADLS PTA  Pt reports granddaugther assists w/ ADLS/IADLS and her adult day care assists w/ medidcation management  Reciprocal Relationships Pt reports she lives with her granddaughter  However, per CM note, pt lives with SO in apartment w/ elevator access  Service to Others Pt reports she is unemployed    Intrinsic Gratification Pt enjoys going to the [employer] and attends an adult day care M-F for 5 hours a day   Pain Assessment   Pain Assessment No/denies pain   Pain Score No Pain   Bed Mobility   Rolling R 5  Supervision   Rolling L 5  Supervision   Supine to Sit 4  Minimal assistance   Additional items Assist x 1   Transfers   Sit to Stand 4  Minimal assistance   Additional items Assist x 1   Stand to Sit 4  Minimal assistance   Additional items Assist x 1   Functional Mobility   Functional Mobility 4  Minimal assistance   Additional items Rolling walker   Cognition   Arousal/Participation Alert; Cooperative   Attention Within functional limits   Following Commands Follows one step commands without difficulty   Assessment   Assessment Pt  seen for OT treatment session to complete functional outcome measures to assess: UE/LE functioning, trunk control, balance, proprioception, cognition and anxious/depressive symptoms    Pt  pleasant and agreeable to participate and overall, pt  tolerated session well  Pt  Completed the following functional recovery indices to identify LT prognostic outcome potential and anxiety/depression screens: The [place] Prognostic Scale, Motricity Index and Trunk Control Test, Neuro QOL Anxiety Short From, and Neuro QOL Depression Short Form  Please see detailed scores and interpretation below  After completion of outcome measures, Pt  Was educated on the results and functional implications of the same  Additionally, it should be noted that pt  Completed bed mobility at a min A level, functional transfers at a min A level, and functional mobility with RW at a min A  level  Further ADLs were not completed during this treatment session as session was focused on above assessments  Pt  Continues to display the following deficits: decreased functional activity tolerance, generalized weakness, deconditioning, decreased standing/ambulatory balance, decreased safety awareness  Pt  will continue to benefit from OT services to address noted deficits and maximize functional gains  Continue to rec  STR s/p d c    Plan   Treatment Interventions ADL retraining;Functional transfer training; Endurance training;Cognitive reorientation;Patient/family training;Equipment evaluation/education; Compensatory technique education;Continued evaluation; Energy conservation; Activityengagement   Goal Expiration Date 12/23/17   OT Frequency 3-5x/wk   Recommendation   OT Discharge Recommendation Short Term Rehab   OT - OK to Discharge Yes   Barthel Index   Feeding 10   Bathing 0   Grooming Score 5   Dressing Score 5   Bladder Score 10   Bowels Score 10   Toilet Use Score 5   Transfers (Bed/Chair) Score 10   Mobility (Level Surface) Score 0   Stairs Score 0   Barthel Index Score 55   Modified Nohemi Scale   Modified Walworth Scale 4   Neuro QOL Depression Screen - In the Past 7 Days:   I felt depressed 3   I felt hopeless 1   I felt like nothing could cheer me up 1   I felt that my life was empty 1   I felt worthless 1   I felt unhappy 3   I felt I had no reason for living 1   I felt that nothing was interesting 3   Depression Score 14   Neuro QOL Anxiety Screen - In the Past 7 Days:   I felt uneasy 4   I felt nervous 3   Many situations made me worry 3   My worries overwhelmed me 3   I felt tense 2   I had difficulty calming down 1   I had sudden feelings of panic 1   I felt nervous when my normal routine was disturbed 3   Anxiety Score 20     [place] Prognostic Score Results  --Used to assess UE motor deficit, proprioception, balance and cognition  Scores < 3 2 indicate high likelihood of returning home, scores between 3 2-5 2 indicate that pt 's generally respond better to rehabilitation, scores >5 2 indicate pt 's are typically dependent with an increased risk of institutionalization      -Motor deficits in arm: 0     -Proprioception: 0     -Balance: 0 4     -Cognition (Hodkinson's Mental Test): 0 4     TOTAL: 2 4- indicating high likelihood of returning home     Motricity Index and Trunk Control Test:   -- used to assess UE/LE function of the affected side and trunk control  Scores range from 0-100 with 0 being dependent and 100 being independent  ARM: 100     LE     SIDE: 100     TRUNK: 75        Anxiety- Short Form:   --used to measure anxious s/s  For scoring purposes, scores of 25+ indicate the threshold for treatment per neurology/SLIM    Score: 20/40  Pt  Most often chose the response sometimes when asked about feeling anxious s/s      Depression- Short Form:   --used to measure depressive s/s  For scoring purposes, scores of 25+ indicate the threshold for treatment per neurology/SLIM    Score:14/40  Pt  Most often chose the response never when asked about feeling depressive s/s       Lisa Man, MOT, OTR/L

## 2017-12-15 VITALS
DIASTOLIC BLOOD PRESSURE: 74 MMHG | WEIGHT: 240.2 LBS | OXYGEN SATURATION: 98 % | TEMPERATURE: 98.1 F | HEIGHT: 64 IN | BODY MASS INDEX: 41.01 KG/M2 | SYSTOLIC BLOOD PRESSURE: 123 MMHG | RESPIRATION RATE: 23 BRPM | HEART RATE: 60 BPM

## 2017-12-15 PROBLEM — R41.82 ALTERED MENTAL STATUS: Status: RESOLVED | Noted: 2017-12-10 | Resolved: 2017-12-15

## 2017-12-15 PROBLEM — N17.9 ACUTE KIDNEY INJURY (HCC): Status: RESOLVED | Noted: 2017-12-11 | Resolved: 2017-12-15

## 2017-12-15 LAB
GLUCOSE SERPL-MCNC: 104 MG/DL (ref 65–140)
GLUCOSE SERPL-MCNC: 188 MG/DL (ref 65–140)
GLUCOSE SERPL-MCNC: 213 MG/DL (ref 65–140)

## 2017-12-15 PROCEDURE — 82948 REAGENT STRIP/BLOOD GLUCOSE: CPT

## 2017-12-15 RX ORDER — INSULIN GLARGINE 100 [IU]/ML
50 INJECTION, SOLUTION SUBCUTANEOUS EVERY 12 HOURS SCHEDULED
Status: DISCONTINUED | OUTPATIENT
Start: 2017-12-15 | End: 2017-12-15 | Stop reason: HOSPADM

## 2017-12-15 RX ORDER — VALSARTAN 80 MG/1
80 TABLET ORAL EVERY 12 HOURS SCHEDULED
Qty: 30 TABLET | Refills: 0
Start: 2017-12-15

## 2017-12-15 RX ORDER — AMLODIPINE BESYLATE 5 MG/1
5 TABLET ORAL EVERY 12 HOURS
Qty: 30 TABLET | Refills: 0
Start: 2017-12-15

## 2017-12-15 RX ORDER — METOPROLOL SUCCINATE 50 MG/1
50 TABLET, EXTENDED RELEASE ORAL EVERY 12 HOURS
Qty: 30 TABLET | Refills: 0 | Status: SHIPPED | OUTPATIENT
Start: 2017-12-15

## 2017-12-15 RX ORDER — LEVETIRACETAM 1000 MG/1
1000 TABLET ORAL 2 TIMES DAILY
Refills: 0
Start: 2017-12-15

## 2017-12-15 RX ORDER — PANTOPRAZOLE SODIUM 40 MG/1
40 TABLET, DELAYED RELEASE ORAL
Qty: 30 TABLET | Refills: 0
Start: 2017-12-16

## 2017-12-15 RX ADMIN — INSULIN GLARGINE 55 UNITS: 100 INJECTION, SOLUTION SUBCUTANEOUS at 09:15

## 2017-12-15 RX ADMIN — SENNOSIDES 8.6 MG: 8.6 TABLET, FILM COATED ORAL at 08:25

## 2017-12-15 RX ADMIN — CLOPIDOGREL BISULFATE 75 MG: 75 TABLET ORAL at 08:24

## 2017-12-15 RX ADMIN — VALSARTAN 80 MG: 80 TABLET, FILM COATED ORAL at 08:23

## 2017-12-15 RX ADMIN — CLONIDINE HYDROCHLORIDE 0.2 MG: 0.2 TABLET ORAL at 08:24

## 2017-12-15 RX ADMIN — LEVETIRACETAM 1000 MG: 500 TABLET ORAL at 08:23

## 2017-12-15 RX ADMIN — DOCUSATE SODIUM 100 MG: 100 CAPSULE, LIQUID FILLED ORAL at 08:25

## 2017-12-15 RX ADMIN — PHENYTOIN SODIUM 100 MG: 100 CAPSULE ORAL at 08:24

## 2017-12-15 RX ADMIN — INSULIN LISPRO 2 UNITS: 100 INJECTION, SOLUTION INTRAVENOUS; SUBCUTANEOUS at 11:29

## 2017-12-15 RX ADMIN — ENOXAPARIN SODIUM 40 MG: 40 INJECTION SUBCUTANEOUS at 08:25

## 2017-12-15 RX ADMIN — PANTOPRAZOLE SODIUM 40 MG: 40 TABLET, DELAYED RELEASE ORAL at 05:00

## 2017-12-15 RX ADMIN — METOPROLOL SUCCINATE 50 MG: 50 TABLET, EXTENDED RELEASE ORAL at 08:24

## 2017-12-15 RX ADMIN — ACETAMINOPHEN 975 MG: 325 TABLET, FILM COATED ORAL at 08:23

## 2017-12-15 RX ADMIN — AMLODIPINE BESYLATE 5 MG: 5 TABLET ORAL at 09:15

## 2017-12-15 RX ADMIN — ASPIRIN 81 MG 81 MG: 81 TABLET ORAL at 08:25

## 2017-12-15 NOTE — SOCIAL WORK
The patient is cleared for discharge to rehab today and Criss [facility]ura has bed available  [address] [facility], [phone] with [facility]  He will work on shuttle transport for the patient and inform CM of pickup time

## 2017-12-15 NOTE — SOCIAL WORK
Per Jose Quant the  will be here at 3:00-3:30 to transport patient to [facility]  Informed patient, son, RN and MD  Left message for Tamy Hooker with transport time

## 2017-12-15 NOTE — PROGRESS NOTES
Cardiology Progress Note - Blossom Stewart 59 y o  female MRN: [id number]    Unit/Bed#: [facility] 531-01 Encounter: [id number]      Assessment:  Principal Problem:    Hypertensive urgency  Active Problems:    Toxic metabolic encephalopathy    Cardiac disease    COPD (chronic obstructive pulmonary disease) (HCC)    CHF (congestive heart failure) (HCC)    Diabetes mellitus (HCC)    Seizures ([facility] )    Acute kidney injury ([facility] )      Plan:  Patient is comfortable this morning  She has no chest pain or significant dyspnea  On telemetry she is in sinus rhythm with a rate in the 60s  Her blood pressures at times are a little labile but in general her blood pressure has trended downward with antihypertensive therapy  She apparently is in the process of discharge planning  Would anticipate discharging her on her present antihypertensive regimen  Subjective:   Patient seen and examined  No significant events overnight   negative  Objective:     Vitals: Blood pressure (!) 180/81, pulse 69, temperature 98 9 °F (37 2 °C), temperature source Oral, resp  rate 20, height 5' 4" (1 626 m), weight 109 kg (240 lb 3 2 oz), SpO2 99 %  , Body mass index is 41 23 kg/m² , Orthostatic Blood Pressures    Flowsheet Row Most Recent Value   Blood Pressure   180/81 filed at 12/15/2017 0759   Patient Position - Orthostatic VS  Sitting filed at 12/15/2017 0759      ,      Intake/Output Summary (Last 24 hours) at 12/15/17 0840  Last data filed at 12/14/17 2102   Gross per 24 hour   Intake              600 ml   Output             1050 ml   Net             -450 ml       No significant arrhythmias seen on telemetry review         Physical Exam:    GEN: Blossom Stewart appears well, alert and oriented x 3, pleasant and cooperative   NECK: supple, no carotid bruits, no JVD or HJR  HEART: normal rate, regular rhythm, normal S1 and S2, no murmurs, clicks, gallops or rubs   LUNGS: clear to auscultation bilaterally; no wheezes, rales, or rhonchi ABDOMEN: normal bowel sounds, soft, no tenderness, no distention  EXTREMITIES: peripheral pulses normal; no clubbing, cyanosis, or edema  SKIN: warm and well perfused, no suspicious lesions on exposed skin    Labs & Results:    Admission on 12/10/2017   No results displayed because visit has over 200 results  Xr Chest 2 Views    Result Date: 12/11/2017  Narrative: CHEST INDICATION:  Chest pain, shortness of breath and vomiting  COMPARISON:  None VIEWS:  Frontal and lateral projections IMAGES:  2 FINDINGS:     The heart is enlarged  The pulmonary vessels appear normal  The lungs and pleural spaces are clear  Visualized osseous structures appear within normal limits for the patient's age  Impression: No acute abnormality in the chest  Workstation performed: [id number]     Ct Head Without Contrast    Result Date: 12/10/2017  Narrative: CT BRAIN - WITHOUT CONTRAST INDICATION:  Headache  Dizziness  Hypertension  COMPARISON:  None  TECHNIQUE:  CT examination of the brain was performed  In addition to axial images, coronal reformatted images were created and submitted for interpretation  Radiation dose length product (DLP) for this visit:  993 mGy-cm   This examination, like all CT scans performed in the [facility], was performed utilizing techniques to minimize radiation dose exposure, including the use of iterative reconstruction and automated exposure control  IMAGE QUALITY:  Diagnostic  FINDINGS:  PARENCHYMA:  Decreased attenuation is noted in the supratentorial white matter demonstrating an appearance most consistent with mild microangiopathic change  No intracranial mass, mass effect or midline shift  No CT signs of acute infarction  There is no parenchymal hemorrhage  VENTRICLES AND EXTRA-AXIAL SPACES:  Normal for patient's age  VISUALIZED ORBITS AND PARANASAL SINUSES:  Unremarkable   CALVARIUM AND EXTRACRANIAL SOFT TISSUES:   Normal      Impression: No acute intracranial abnormality  Workstation performed: [id number]     Mri Brain Wo Contrast    Result Date: 12/12/2017  Narrative: MRI BRAIN WITHOUT CONTRAST INDICATION:     COMPARISON:   None  TECHNIQUE:  Sagittal T1, axial T2, axial FLAIR, axial T1, axial [place] and axial diffusion imaging  IMAGE QUALITY:  Diagnostic  FINDINGS: BRAIN PARENCHYMA:  There is a punctate focus of restricted diffusion in the right parietal corona radiata and periventricular white matter consistent with an acute lacunar infarct  T-2/FLAIR hyperintense foci in the periventricular and subcortical white matter likely represent microangiopathic disease  Punctate foci of susceptibility artifact in the left cerebellar hemisphere right thalamus and bilateral lentiform represent chronic microhemorrhage  No evidence of mass or mass effect  VENTRICLES:  The ventricles are normal in size and contour  SELLA AND PITUITARY GLAND:  Normal  ORBITS:  Normal  PARANASAL SINUSES:  Minimal mucosal thickening in maxillary sinuses without significant paranasal sinus disease  VASCULATURE:  Evaluation of the major intracranial vasculature demonstrates appropriate flow voids  CALVARIUM AND SKULL BASE:  Normal  EXTRACRANIAL SOFT TISSUES:  Normal      Impression: 1  Acute lacunar infarct in the right parietal corona radiata and periventricular white matter  2  Foci of chronic microhemorrhage in the basal ganglia and left cerebellar hemisphere suggestive of sequela of chronic hypertension  No acute hemorrhage or mass effect  3  Periventricular and subcortical white matter lesions, likely to represent microangiopathic disease  Workstation performed: [id number]     Vas Carotid Complete Study    Result Date: 12/12/2017  Narrative:  [facility] REPORT CLINICAL: Indications: Patient presents with altered mental status and CVA  Physician wants to rule out carotid artery stenosis   Operative History Cardiac catheter Cholecystectomy Risk Factors The patient has history of obesity, HTN, Diabetes, CHF, seizure disorder, pulmonary status, and renal disease  The patient's current BMI is 41 19, Weight (lb) is 240 lb and Height (in) is 64 in  She is a non-smoker  Clinical Right Brachial Pressure:  162/79 mmHg, Left:  IV site  FINDINGS:  Right        Impression  PSV  EDV (cm/s)  Direction of Flow  Ratio  Dist  ICA                 65          21                      0 60  Mid  ICA                  58          19                      0 53  Prox  ICA    1 - 49%      69          15                      0 63  Dist CCA                  76          11                            Mid CCA                  109          18                      1 00  Prox CCA                 109          16                            Ext Carotid              159          17                      1 46  Prox Vert                 60          11  Antegrade                 Subclavian               106          12                             Left         Impression  PSV  EDV (cm/s)  Direction of Flow  Ratio  Dist  ICA                 75          19                      0 67  Mid  ICA                  74          14                      0 66  Prox  ICA    1 - 49%      85          20                      0 75  Dist CCA                  85          16                            Mid CCA                  112          17                      0 90  Prox CCA                 124          19                            Ext Carotid              119          10                      1 06  Prox Vert                 83          16  Antegrade                 Subclavian               103           7                               CONCLUSION:  Impression  RIGHT: There is <50% stenosis noted in the internal carotid artery  Plaque is heterogenous and smooth  Vertebral artery flow is antegrade  There is no significant subclavian artery disease  LEFT: There is <50% stenosis noted in the internal carotid artery  Plaque is heterogenous and smooth  Vertebral artery flow is antegrade  There is no significant subclavian artery disease  TECH NOTE: There is minimal plaque identified in the B/L ICA  Technically difficult bedside study  Internal carotid artery stenosis determination by consensus criteria from: David Parker et al  Carotid Artery Stenosis: Gray-Scale and Doppler US Diagnosis - Society of Radiologists in [address] [place] [address] Drive, Radiology 2003; [phone]  SIGNATURE: Electronically Signed by: Jaspal Morales MD on 2017-12-12 05:11:17 PM      EKG personally reviewed by Maritza Cai MD      Counseling / Coordination of Care  Total floor / unit time spent today 30 minutes  Greater than 50% of total time was spent with the patient and / or family counseling and / or coordination of care

## 2017-12-15 NOTE — PROGRESS NOTES
Progress Note - Edd Luther 59 y o  female MRN: [id number]    Unit/Bed#: [facility] 531-01 Encounter: [id number]      CC: diabetes f/u    Subjective:   Edd Luther is a 59y o  year old female with type 2 diabetes  Feels well  No complaints  Did have hypoglycemia overnight and yesterday as listed below  Objective:     Vitals: Blood pressure 123/74, pulse 60, temperature 98 1 °F (36 7 °C), temperature source Oral, resp  rate (!) 23, height 5' 4" (1 626 m), weight 109 kg (240 lb 3 2 oz), SpO2 98 %  ,Body mass index is 41 23 kg/m²  Intake/Output Summary (Last 24 hours) at 12/15/17 1115  Last data filed at 12/14/17 2102   Gross per 24 hour   Intake              360 ml   Output             1050 ml   Net             -690 ml       Physical Exam:  General: No acute distress  Alert, awake  HEENT: Normocephalic, atraumatic  Heart: Regular rate and rhythm  Lungs: Clear  No respiratory distress  Extremities: No edema  Skin: Warm, dry  No rash  Neuro: Moves all 4 extremities spontaneously  Psych: Appropriate mood and affect  Cooperative  Lab, Imaging and other studies: I have personally reviewed pertinent reports  POC Glucose (mg/dl)   Date Value   12/15/2017 188 (H)   12/15/2017 104   12/15/2017 213 (H)   12/14/2017 99   12/14/2017 67   12/14/2017 99   12/14/2017 58 (L)   12/14/2017 240 (H)   12/14/2017 124   12/13/2017 207 (H)       Assessment:  DM 2 with hyperglycemia, long term insulin use, ?noncompliance    Plan:  Decreased Humalog to 18 ac  Decreased algorithm of sliding scale as she did go low after correction dose given  Will also cut back Lantus to 50 BID  Will continue to follow and adjust as needed  Monitor for hypoglycemia

## 2017-12-16 NOTE — DISCHARGE SUMMARY
Discharge Summary - [address] Hospitalist Service - Internal Medicine      Patient Information: Azucena Monzon 59 y o  female MRN: [id number]  Unit/Bed#: [facility] Encounter: [id number]    Discharging Physician / Practitioner: Yvonne Almendarez MD  PCP: No primary care provider on file  Admission Date: 12/10/2017  Discharge Date: 12/15/17      Reason for Admission:  Altered mental status and elevated blood pressure      Discharge Diagnoses:     Principal Problem(s):    Hypertensive urgency    Acute Right parietal CVA    Chronic Problems:    CAD    Chronic Diastolic CHF    Insulin-dependent diabetes mellitus    Seizure Disorder     Morbid Obesity    Hypertension    Medication noncompliance    History of Prior basal ganglia CVA    Resolved Problems:    Acute kidney injury       Consultations During Hospital Stay:  · Neurology  · Endocrinology  · Cardiology      Hospital Course:     Azucena Monzon is a 59 y o  female patient who originally presented to the hospital on 12/10/2017 due to confusion with blank stares her while she was at a casino  She is originally from [place] but was visiting the [address] [place] [address] here in [place], [place]  In the ER, she was found to have systolic blood pressures above 200 along with uncontrolled hyperglycemia  A serum acetone level was negative however  She was on a home regimen including Clonidine  Toprol-XL and was given intravenous labetalol and nitro paste in the ER  She was also initially started on an insulin drip which effectively improved her blood sugars  Due to her prior history of seizures, an EEG was ordered by Neurology which ruled out active epileptiform waves  Her serum Dilantin level was found to be subtherapeutic which raised suspicion for noncompliance to her medication regimen    ASA and Plavix are also her home medications and a CT of the brain along with an MRI confirmed an acute right parietal CVA despite apparently being on dual anti-platelet therapy  Her initially presenting left arm weakness did progressively improve through her hospital stay  An echocardiogram revealed an EF of 70% with trace TR and grade 2 diastolic dysfunction without evidence of regional wall motion abnormalities  A lipid panel revealed a well controlled LDL of 69  API/P2Y12 markers were also ordered which were negative thus increasing the suspicion of medication noncompliance  She was thoroughly counseled on actively taking her medications and this incidence of a repeat stroke with uncontrolled hypertension apparently opened her eyes  In addition to her pre-existing hypertensive regimen, she was started on Norvasc two earlier in the hospitalization along with valsartan through the latter course of her stay by cardiology  Diet/lifestyle modifications including a low-sodium diet were strongly encouraged  She was seen by Endocrinology as well who titrated her insulin regimen  Her HgA1c revealed poor control at 11 2  She classifies as morbidly obese with the BMI of 41 23  Her serum creatinine level which was initially elevated normalized after cessation of Lisinopril, however as previously mentioned, Cardiology was okay with starting her on Valsartan for optimal blood pressure control  She was seen by PT/OT recommended skilled rehabilitation placement and was successfully placed at a facility in [address] [place] [address] close to her family  She will follow up with her PCP and specialists as instructed        Condition at Discharge: good       Discharge Day Visit / Exam:     Vitals: Blood Pressure: 123/74 (12/15/17 1111)  Pulse: 60 (12/15/17 1111)  Temperature: 98 1 °F (36 7 °C) (12/15/17 1111)  Temp Source: Oral (12/15/17 1111)  Respirations: (!) 23 (12/15/17 1111)  Height: 5' 4" (162 6 cm) (12/10/17 2111)  Weight - Scale: 109 kg (240 lb 3 2 oz) (12/10/17 2111)  SpO2: 98 % (12/15/17 1111)      Physical exam - I had a face-to-face encounter with the patient on day of discharge  Discussion with Patient and/or Family:  The patient has been advised to return to the ER immediately if any symptoms recur or worsen  Discharge instructions/Information to Patient and/or Family:   See after visit summary for information provided to patient and/or family  Provisions for Follow-Up Care:  See after visit summary for information related to follow-up care and any pertinent home health orders  Disposition:   [facility] in [place], [place]        Discharge Statement:  I spent 39 minutes discharging the patient  This time was spent on the day of discharge  I had direct contact with the patient on the day of discharge  Greater than 50% of the total time was spent examining patient, answering all patient questions, arranging and discussing plan of care with patient as well as directly providing post-discharge instructions  Additional time then spent on discharge activities  Discharge Medications:  See after visit summary for reconciled discharge medications provided to patient and family        ** Please Note: This note has been constructed using a voice recognition system **

## 2017-12-18 LAB
METANEPH FREE SERPL-MCNC: 21 PG/ML (ref 0–62)
NORMETANEPHRINE SERPL-MCNC: 59 PG/ML (ref 0–145)

## 2018-01-23 NOTE — PROCEDURES
Procedures by Thomas Vora MD at 2017   4:28 PM      Author:  Thomas Vora MD Service:  Neurology Author Type:  Physician    Filed:  2017  4:33 PM Date of Service:  2017  4:28 PM Status:  Signed    :  Thomas Vora MD (Physician)        Procedure Orders:       1  EEG awake or drowsy routine [[id number]] ordered by Aleks Cabrera PA-C at 17 1612                  ELECTROENCEPHALOGRAM (EEG)      Patient Name:  Samantha Rivers  MRN: [id number]   :  1960 File #: Pablo Daniel 13-1   Age: 59 y o  Encounter #: [id number]   Date performed: 2017            Report date: 2017          Study type: Routine EEG    ICD 10 diagnosis: Other Epilepsy G40 909    Start time: 10:45 End time: 11:21     -------------------------------------------------------------------------------------------------------------------   Patient History: This recording was observed in a 59 y o  female with epilepsy to better characterize epilepsy       Medications include:   amLODIPine 5 mg Oral Q12H   aspirin 81 mg Oral Daily   cloNIDine 0 2 mg Oral BID   clopidogrel 75 mg Oral Daily   docusate sodium 100 mg Oral BID   enoxaparin 40 mg Subcutaneous Daily   hydrALAZINE 10 mg Intravenous Q6H [address]   insulin glargine 55 Units Subcutaneous Q12H [address]   insulin lispro 1-6 Units Subcutaneous HS   insulin lispro 10 Units Subcutaneous Once   insulin lispro 22 Units Subcutaneous TID With Meals   insulin lispro 4-20 Units Subcutaneous TID AC   levETIRAcetam 1,000 mg Oral Q12H [address]   metoprolol succinate 50 mg Oral Daily   pantoprazole 40 mg Intravenous Q24H NISH   phenytoin 100 mg Oral TID   phenytoin 100 mg Oral HS   senna 1 tablet Oral Daily       -------------------------------------------------------------------------------------------------------------------   Description of Procedure:  ·  32 channel digital recording with electrodes placed according to the International 10-20 system with additional  T1/T2 electrodes, EOG, EKG, and simultaneous  video  The recording was technically satisfactory  -------------------------------------------------------------------------------------------------------------------   EEG Description:    The recording was performed with the patient awake  She was fully oriented  During wakefulness, there were long runs of well regulated,  low amplitude, posteriorly dominant, symmetric 11-12 cps alpha rhythm that attenuate d with eye opening  There were symmetric low amplitude, frontally dominant beta activities  There were intermittent semi-rhythmic 3-4 cps delta activities seen independently  in the right and left temporal areas  Drowsiness and sleep were not attained  No epileptiform discharges were seen  -------------------------------------------------------------------------------------------------------------------   Activation Procedures:  Hyperventilation was not performed  Stepped photic stimulation between 1-20 cps was performed and did not induce  any changes  Other findings: The single lead ECG demonstrated regular rhythm    -------------------------------------------------------------------------------------------------------------------   EEG Interpretation: This Routine EEG recorded during wakefulness is abnormal   Independent right and left temporal delta activities suggests areas of underlying neuronal dysfunction in the right and left temporal delta areas  Des Hays MD   [address] [facility]               Received for:Albert GUNDERSON    Dec 13 2017 11:54AM [facility] Standard Time

## 2021-10-14 NOTE — PROGRESS NOTES
[facility]  Patient: Danni Cooper 59 y o  female   MRN: [id number]  PCP: No primary care provider on file  [facility] Encounter: [id number]  Date Of Visit: 17         Subjective:   Seen examined earlier today  Nursing reports episodes of fluctuating hyperglycemia  Patient herself complains of mild headache which is starting to resolve as the day progresses  Denies any chest pain/nausea/vomiting  Also complaining of mild left hand weakness  Objective:     Vitals:   Temp (24hrs), Av 4 °F (36 9 °C), Min:98 1 °F (36 7 °C), Max:98 7 °F (37 1 °C)    HR:  [72-78] 73  Resp:  [18-22] 21  BP: (118-162)/(57-98) 132/60  SpO2:  [95 %-97 %] 96 %  Body mass index is 41 23 kg/m²  Input and Output Summary (last 24 hours):        Intake/Output Summary (Last 24 hours) at 17 1825  Last data filed at 17 1737   Gross per 24 hour   Intake             1232 ml   Output              880 ml   Net              352 ml       Physical Exam:     GENERAL:  Obese - no acute distress  HEAD:  Normocephalic - atraumatic  EYES: PERRL - EOMI   MOUTH:  Mucosa moist  NECK:  Supple - full range of motion  CARDIAC:  Regular rate/rhythm - S1/S2 positive  PULMONARY:  Clear breath sounds bilaterally - nonlabored respirations  ABDOMEN:  Soft - nontender/nondistended - active bowel sounds  MUSCULOSKELETAL:  Motor strength/range of motion somewhat deconditioned  NEUROLOGIC:  Alert/oriented x 3 - no nystagmus - motor strength currently intact in all four extremities  SKIN:  Chronic wrinkles/blemishes   PSYCHIATRIC:  Mood/affect age-appropriate      Additional Data:    Labs & Recent Cultures:       Results from last 7 days  Lab Units 17  0437  12/10/17  1730   WBC Thousand/uL 9 91  --  8 59   HEMOGLOBIN g/dL 11 2*  --  12 2   I STAT HEMOGLOBIN   --   < >  --    HEMATOCRIT % 35 0  --  39 1   PLATELETS Thousands/uL 222  < > 251   NEUTROS PCT %  -- --  60   LYMPHS PCT %  --   --  33   MONOS PCT %  --   --  5   EOS PCT %  --   --  2   < > = values in this interval not displayed  Results from last 7 days  Lab Units 12/12/17  1209 12/11/17  0437   SODIUM mmol/L 136 136   POTASSIUM mmol/L 3 8 3 8   CHLORIDE mmol/L 102 100   CO2 mmol/L 27 27   BUN mg/dL 23 23   CREATININE mg/dL 1 23 1 45*   CALCIUM mg/dL 9 0 9 3   TOTAL PROTEIN g/dL  --  6 9   BILIRUBIN TOTAL mg/dL  --  0 27   ALK PHOS U/L  --  109   ALT U/L  --  18   AST U/L  --  11   GLUCOSE RANDOM mg/dL 294* 227*       Results from last 7 days  Lab Units 12/11/17  0455   INR  1 11                 Last 24 Hours Medication List:     amLODIPine 5 mg Oral Q12H   aspirin 81 mg Oral Daily   atorvastatin 40 mg Oral Daily With Dinner   cloNIDine 0 2 mg Oral BID   clopidogrel 75 mg Oral Daily   docusate sodium 100 mg Oral BID   enoxaparin 40 mg Subcutaneous Daily   hydrALAZINE 10 mg Intravenous Q6H [address]   insulin glargine 55 Units Subcutaneous Q12H NISH   insulin lispro 1-6 Units Subcutaneous HS   insulin lispro 10 Units Subcutaneous Once   insulin lispro 22 Units Subcutaneous TID With Meals   insulin lispro 4-20 Units Subcutaneous TID AC   levETIRAcetam 1,000 mg Oral Q12H NISH   metoprolol succinate 50 mg Oral Daily   pantoprazole 40 mg Intravenous Q24H NISH   phenytoin 100 mg Oral TID   phenytoin 100 mg Oral HS   senna 1 tablet Oral Daily         Assessments:    1  Acute Right Parietal CVA  2  Chronic Basal Ganglia/Left Cerebellar Micro-hemorrhaging - Hypertensive Crisis on Admission   3  H/o Hypertension   4  Uncontrolled Diabetes Mellitus type 2   5  Acute Kidney Injury   6  Morbid Obesity   7  Chronic Diastolic CHF  8  CAD  9  H/o Seizures   10  Medication Noncompliance       Plan:    · Appreciate neurology evaluation/input  MRI of brain findings noted  Symptomatology is secondary to uncontrolled hypertension/diabetes mellitus on admission with long-term noncompliance to home medication regimen    Carotid Dopplers today revealed bilateral stenosis of <50%  Awaiting lipid panel tomorrow morning  Continue ASA/Plavix - API/P2Y12 markers are within normal limits indicative of noncompliance to ASA/Plavix regimen  Decreased serum Dilantin level despite aggressive home regimen also indicates noncompliance  Strongly counseled on blood sugar/BP control via medication compliance which patient seems to acknowledge at this point  Started on a statin regimen today - await fasting lipid panel tomorrow morning  · Continue home Clonidine/Toprol-XL regimen - started on Norvasc at this admission - also initiated scheduled IV Hydralazine pushes which will be switched to PRN if BP remains stable overnight - low sodium diet encouraged  · HgA1c uncontrolled @ 11 2 - stop home oral hypoglycemics as patient will need to be on an insulin regimen from this point onwards - on basal/prandial insulin regimen with additional PRN SSI coverage per accuchecks - serum acetone level normal on 12/10  · Serum creatinine continues to improve with cessation of Lisinopril  Monitor renal function and avoid nephrotoxins if possible  · BMI of 41 23 noted  Lifestyle/diet modifications heavily encouraged - seems unlikely to comply however  · EF of 70% noted - continue Toprol-Xl  · As mentioned above, encouraged compliance to ASA/Plavix regimen - started on a statin now  · Continue Keppra/Dilantin ER regimen - subtherapeutic Dilantin level noted - EEG negative for acute epileptiform waves  · Will await PT/OT recommendations for discharge disposition  VTE Prophylaxis:  Lovenox        Time Spent for Care: 34 minutes  More than 50% of total time spent on counseling and coordination of care as described above  Current Length of Stay: 2 day(s)      Code Status: Level 1 - Full Code       Today, Patient Was Seen By: Vallorie Fabry, MD    ** Please Note: This note has been constructed using a voice recognition system   ** Normal for race